# Patient Record
Sex: MALE | Race: BLACK OR AFRICAN AMERICAN | NOT HISPANIC OR LATINO | Employment: UNEMPLOYED | URBAN - METROPOLITAN AREA
[De-identification: names, ages, dates, MRNs, and addresses within clinical notes are randomized per-mention and may not be internally consistent; named-entity substitution may affect disease eponyms.]

---

## 2018-08-14 ENCOUNTER — OFFICE VISIT (OUTPATIENT)
Dept: FAMILY MEDICINE CLINIC | Facility: CLINIC | Age: 38
End: 2018-08-14
Payer: COMMERCIAL

## 2018-08-14 VITALS
HEART RATE: 60 BPM | HEIGHT: 69 IN | TEMPERATURE: 98.4 F | BODY MASS INDEX: 26.39 KG/M2 | SYSTOLIC BLOOD PRESSURE: 124 MMHG | DIASTOLIC BLOOD PRESSURE: 88 MMHG | WEIGHT: 178.2 LBS | RESPIRATION RATE: 16 BRPM

## 2018-08-14 DIAGNOSIS — Z31.41 ENCOUNTER FOR FERTILITY TESTING: ICD-10-CM

## 2018-08-14 DIAGNOSIS — M62.830 SPASM OF BACK MUSCLES: Primary | ICD-10-CM

## 2018-08-14 PROCEDURE — 3008F BODY MASS INDEX DOCD: CPT | Performed by: FAMILY MEDICINE

## 2018-08-14 PROCEDURE — 99203 OFFICE O/P NEW LOW 30 MIN: CPT | Performed by: FAMILY MEDICINE

## 2018-08-14 RX ORDER — TIZANIDINE 2 MG/1
TABLET ORAL
Qty: 30 TABLET | Refills: 0 | Status: SHIPPED | OUTPATIENT
Start: 2018-08-14 | End: 2018-10-10 | Stop reason: HOSPADM

## 2018-08-14 NOTE — PROGRESS NOTES
Chief Complaint   Patient presents with    New Patient     New patient here to establish care  Assessment/Plan:    Spasm of back muscles  Prescription sent to the pharmacy for Tizanidine 2 mg to take 1 tablet twice daily PRN for back spasms  Recommended to call office if symptoms persist or worsen  Encounter for fertility testing  Referred patient to urology for evaluation, discussed fertility testing  HM:  recommended to follow a well-balanced diet,   regular exercise  Advised to stop smoking  Schedule PE next year  Diagnoses and all orders for this visit:    Spasm of back muscles  -     tiZANidine (ZANAFLEX) 2 mg tablet; Take 1 tab  Twice daily PRN for back spasms    Encounter for fertility testing  -     Ambulatory referral to Urology; Future          Subjective:      Patient ID: Sarabjit Sandoval is a 34 y o  male  HPI     New patient presents to establish medical care  He has not been seen by physicians in the area   Patient is originally from [place], came to [place]    2 5 years ago  He lives with his wife  He does not have children, wants to make sure that he does not have any problems with fertility  Patient exercising regularly, goes to the gym, lift weights  C/o occasional back spasms  Denies CP, SOB, dizziness  No family H/o HTN, CAD, DM  Patient drinks alcohol, smokes a few times per week  Denies drug use  The following portions of the patient's history were reviewed and updated as appropriate: allergies, current medications, past medical history, past social history, past surgical history and problem list     Review of Systems   Constitutional: Negative for activity change, appetite change, chills, fatigue and fever  HENT: Negative for congestion, ear pain, nosebleeds, sore throat, tinnitus and trouble swallowing  Eyes: Negative for pain, discharge, redness, itching and visual disturbance     Respiratory: Negative for cough, chest tightness, shortness of breath and wheezing  Cardiovascular: Negative for chest pain, palpitations and leg swelling  Gastrointestinal: Negative for abdominal pain, blood in stool, constipation, diarrhea, nausea and vomiting  Genitourinary: Negative for difficulty urinating, dysuria, flank pain, frequency, hematuria, scrotal swelling and testicular pain  Musculoskeletal: Positive for back pain (occasional pain in back, back spasms)  Negative for arthralgias, gait problem, joint swelling and neck pain  Skin: Negative for rash and wound  Neurological: Negative for dizziness, seizures, syncope and headaches  Hematological: Negative for adenopathy  Does not bruise/bleed easily  Psychiatric/Behavioral: Negative  Objective:      /88 (BP Location: Left arm, Patient Position: Sitting, Cuff Size: Standard)   Pulse 60   Temp 98 4 °F (36 9 °C) (Tympanic)   Resp 16   Ht 5' 8 5" (1 74 m)   Wt 80 8 kg (178 lb 3 2 oz)   BMI 26 70 kg/m²          Physical Exam   Constitutional: He appears well-developed and well-nourished  HENT:   Head: Normocephalic and atraumatic  Right Ear: External ear normal    Left Ear: External ear normal    Mouth/Throat: Oropharynx is clear and moist    Eyes: Conjunctivae are normal  Pupils are equal, round, and reactive to light  Neck: Normal range of motion  Neck supple  Cardiovascular: Normal rate, regular rhythm and normal heart sounds  No murmur heard  No carotid bruits BL  No BL LE edema   Pulmonary/Chest: Effort normal and breath sounds normal  He has no wheezes  He has no rales  Abdominal: Soft  Bowel sounds are normal  There is no tenderness  Musculoskeletal: Normal range of motion  He exhibits no edema, tenderness or deformity  Lymphadenopathy:     He has no cervical adenopathy  Skin: Skin is warm and dry  No rash noted  Psychiatric: He has a normal mood and affect  Nursing note and vitals reviewed

## 2018-08-14 NOTE — ASSESSMENT & PLAN NOTE
Prescription sent to the pharmacy for Tizanidine 2 mg to take 1 tablet twice daily PRN for back spasms  Recommended to call office if symptoms persist or worsen

## 2018-10-10 ENCOUNTER — APPOINTMENT (EMERGENCY)
Dept: RADIOLOGY | Facility: HOSPITAL | Age: 38
End: 2018-10-10
Payer: COMMERCIAL

## 2018-10-10 ENCOUNTER — HOSPITAL ENCOUNTER (EMERGENCY)
Facility: HOSPITAL | Age: 38
Discharge: HOME | End: 2018-10-10
Attending: EMERGENCY MEDICINE
Payer: COMMERCIAL

## 2018-10-10 VITALS
TEMPERATURE: 97.3 F | WEIGHT: 179.9 LBS | OXYGEN SATURATION: 99 % | DIASTOLIC BLOOD PRESSURE: 79 MMHG | SYSTOLIC BLOOD PRESSURE: 143 MMHG | HEIGHT: 68 IN | BODY MASS INDEX: 27.26 KG/M2 | RESPIRATION RATE: 18 BRPM | HEART RATE: 78 BPM

## 2018-10-10 DIAGNOSIS — M54.50 LOW BACK PAIN RADIATING TO RIGHT LOWER EXTREMITY: Primary | ICD-10-CM

## 2018-10-10 DIAGNOSIS — M79.651 RIGHT THIGH PAIN: ICD-10-CM

## 2018-10-10 DIAGNOSIS — M79.604 LOW BACK PAIN RADIATING TO RIGHT LOWER EXTREMITY: Primary | ICD-10-CM

## 2018-10-10 PROCEDURE — 99283 EMERGENCY DEPT VISIT LOW MDM: CPT

## 2018-10-10 PROCEDURE — 96372 THER/PROPH/DIAG INJ SC/IM: CPT

## 2018-10-10 PROCEDURE — 73502 X-RAY EXAM HIP UNI 2-3 VIEWS: CPT

## 2018-10-10 RX ORDER — DICLOFENAC POTASSIUM 50 MG/1
50 TABLET, FILM COATED ORAL 3 TIMES DAILY
Qty: 15 TABLET | Refills: 0 | Status: SHIPPED | OUTPATIENT
Start: 2018-10-10 | End: 2018-10-25 | Stop reason: SDUPTHER

## 2018-10-10 RX ORDER — METHOCARBAMOL 500 MG/1
1000 TABLET, FILM COATED ORAL 3 TIMES DAILY
Qty: 30 TABLET | Refills: 0 | Status: SHIPPED | OUTPATIENT
Start: 2018-10-10 | End: 2020-01-20 | Stop reason: ALTCHOICE

## 2018-10-10 RX ORDER — METHOCARBAMOL 500 MG/1
TABLET, FILM COATED ORAL
Status: COMPLETED
Start: 2018-10-10 | End: 2018-10-10

## 2018-10-10 RX ORDER — KETOROLAC TROMETHAMINE 30 MG/ML
INJECTION, SOLUTION INTRAMUSCULAR; INTRAVENOUS
Status: COMPLETED
Start: 2018-10-10 | End: 2018-10-10

## 2018-10-10 RX ORDER — ACETAMINOPHEN 325 MG/1
TABLET ORAL
Status: COMPLETED
Start: 2018-10-10 | End: 2018-10-10

## 2018-10-10 RX ORDER — KETOROLAC TROMETHAMINE 30 MG/ML
15 INJECTION, SOLUTION INTRAMUSCULAR; INTRAVENOUS ONCE
Status: COMPLETED | OUTPATIENT
Start: 2018-10-10 | End: 2018-10-10

## 2018-10-10 RX ORDER — METHOCARBAMOL 500 MG/1
1000 TABLET, FILM COATED ORAL ONCE
Status: COMPLETED | OUTPATIENT
Start: 2018-10-10 | End: 2018-10-10

## 2018-10-10 RX ORDER — SENNOSIDES 8.6 MG
650 CAPSULE ORAL EVERY 8 HOURS PRN
Qty: 15 TABLET | Refills: 0 | Status: SHIPPED | OUTPATIENT
Start: 2018-10-10 | End: 2018-10-15

## 2018-10-10 RX ORDER — ACETAMINOPHEN 325 MG/1
650 TABLET ORAL EVERY 6 HOURS PRN
Status: DISCONTINUED | OUTPATIENT
Start: 2018-10-10 | End: 2018-10-10 | Stop reason: HOSPADM

## 2018-10-10 RX ADMIN — ACETAMINOPHEN 650 MG: 325 TABLET ORAL at 13:52

## 2018-10-10 RX ADMIN — KETOROLAC TROMETHAMINE 15 MG: 30 INJECTION, SOLUTION INTRAMUSCULAR; INTRAVENOUS at 13:52

## 2018-10-10 RX ADMIN — METHOCARBAMOL TABLETS 1000 MG: 500 TABLET, COATED ORAL at 13:52

## 2018-10-10 RX ADMIN — METHOCARBAMOL 1000 MG: 500 TABLET, FILM COATED ORAL at 13:52

## 2018-10-10 NOTE — DISCHARGE INSTRUCTIONS
Acute Low Back Pain   WHAT YOU NEED TO KNOW:   Acute low back pain is sudden discomfort in your lower back area that lasts for up to 6 weeks  The discomfort makes it difficult to tolerate activity  DISCHARGE INSTRUCTIONS:   Return to the emergency department if:   · You have severe pain  · You have sudden stiffness and heaviness on both buttocks down to both legs  · You have numbness or weakness in one leg, or pain in both legs  · You have numbness in your genital area or across your lower back  · You cannot control your urine or bowel movements  Contact your healthcare provider if:   · You have a fever  · You have pain at night or when you rest     · Your pain does not get better with treatment  · You have pain that worsens when you cough or sneeze  · You suddenly feel something pop or snap in your back  · You have questions or concerns about your condition or care  Medicines: The following medicines may be ordered by your healthcare provider:  · Acetaminophen  decreases pain  It is available without a doctor's order  Ask how much to take and how often to take it  Follow directions  Acetaminophen can cause liver damage if not taken correctly  · NSAIDs  help decrease swelling and pain  This medicine is available with or without a doctor's order  NSAIDs can cause stomach bleeding or kidney problems in certain people  If you take blood thinner medicine, always ask your healthcare provider if NSAIDs are safe for you  Always read the medicine label and follow directions  · Prescription pain medicine  may be given  Ask your healthcare provider how to take this medicine safely  · Muscle relaxers  decrease pain by relaxing the muscles in your lower spine  · Take your medicine as directed  Contact your healthcare provider if you think your medicine is not helping or if you have side effects  Tell him of her if you are allergic to any medicine   Keep a list of the medicines, vitamins, and herbs you take  Include the amounts, and when and why you take them  Bring the list or the pill bottles to follow-up visits  Carry your medicine list with you in case of an emergency  Self-care:   · Stay active  as much as you can without causing more pain  Bed rest could make your back pain worse  Start with some light exercises such as walking  Avoid heavy lifting until your pain is gone  Ask for more information about the activities or exercises that are right for you  · Ice  helps decrease swelling, pain, and muscle spams  Put crushed ice in a plastic bag  Cover it with a towel  Place it on your lower back for 20 to 30 minutes every 2 hours  Do this for about 2 to 3 days after your pain starts, or as directed  · Heat  helps decrease pain and muscle spasms  Start to use heat after treatment with ice has stopped  Use a small towel dampened with warm water or a heating pad, or sit in a warm bath  Apply heat on the area for 20 to 30 minutes every 2 hours for as many days as directed  Alternate heat and ice  Prevent acute low back pain:   · Use proper body mechanics  ¨ Bend at the hips and knees when you  objects  Do not bend from the waist  Use your leg muscles as you lift the load  Do not use your back  Keep the object close to your chest as you lift it  Try not to twist or lift anything above your waist     ¨ Change your position often when you stand for long periods of time  Rest one foot on a small box or footrest, and then switch to the other foot often  ¨ Try not to sit for long periods of time  When you do, sit in a straight-backed chair with your feet flat on the floor  Never reach, pull, or push while you are sitting  · Do exercises that strengthen your back muscles  Warm up before you exercise  Ask your healthcare provider the best exercises for you  · Maintain a healthy weight  Ask your healthcare provider how much you should weigh   Ask him to help you create a weight loss plan if you are overweight  Follow up with your healthcare provider as directed:  Return for a follow-up visit if you still have pain after 1 to 3 weeks of treatment  You may need to visit an orthopedist if your back pain lasts more than 12 weeks  Write down your questions so you remember to ask them during your visits  © 2017 [employer] Information is for End User's use only and may not be sold, redistributed or otherwise used for commercial purposes  All illustrations and images included in CareNotes® are the copyrighted property of A D A M [employer] Inc  or [employer]on [employer]  The above information is an  only  It is not intended as medical advice for individual conditions or treatments  Talk to your doctor, nurse or pharmacist before following any medical regimen to see if it is safe and effective for you  Leg Cramps   WHAT YOU NEED TO KNOW:   A leg cramp is a sudden, painful squeeze in your calf (lower leg) or thigh (upper leg) muscles  The muscle may twitch under the skin or feel hard  Your leg may feel sore long after the muscles relax  DISCHARGE INSTRUCTIONS:   To stretch your leg:  Warm up your muscles before you stretch  Take a short walk or run slowly in place  If you get leg cramps while you sleep, you may also want to stretch before bedtime  The following exercises stretch the calves and thighs to help stop or prevent leg cramps:  · To stretch your calf and heel:      ¨ Stand up and place the palms of your hands against a wall  ¨ Lean into the wall, with one leg behind the other  Bend the front leg and keep the back leg straight  ¨ Press the heel of your back leg into the floor  ¨ Hold for 15 to 30 seconds, then switch sides  · To stretch the back of your knee and thigh:      ¨ Sit on the floor with your legs straight in front of you  Do not point your toes  ¨ Place the palms of your hands at your sides on the floor   Slide your hands past your hips toward your ankles  ¨ Move toward your ankles until you feel a stretch in the back of your legs  Do not try to touch your head to your knees or round your back  ¨ Hold for 30 seconds  Drink plenty of liquids during exercise:  Water and other liquids help prevent cramps by replacing fluids lost in sweat  Drink more liquids when you are active in hot weather  To stop a leg cramp if it happens again:   · Stretch and massage your muscle to stop the cramp  · Apply heat or ice packs to the cramp  A heating pad or hot pack may help relieve tight muscles  An ice pack or crushed ice in a bag, wrapped in a towel can soothe tender or sore muscles  Take your medicine as directed:  Call your healthcare provider if you think your medicine is not helping or if you have side effects  Tell him if you are taking any vitamins, herbs, or other medicines  Keep a list of the medicines you take  Include the amounts, and when and why you take them  Bring the list or the pill bottles to follow-up visits  Follow up with your healthcare provider as directed:  Write down any questions you have so you remember to ask them in your follow-up visits  Contact your healthcare provider if:   · Your leg cramps get worse or happen more often  · Your leg feels numb  · Your leg cramps do not go away with stretching or massage  · You feel dizzy, lightheaded, or confused when you exercise in hot weather  You may have a headache or blurred vision  © 2017 [employer] Information is for End User's use only and may not be sold, redistributed or otherwise used for commercial purposes  All illustrations and images included in CareNotes® are the copyrighted property of [employer]  or [employer]on Clemons  The above information is an  only  It is not intended as medical advice for individual conditions or treatments   Talk to your doctor, nurse or pharmacist before following any medical regimen to see if it is safe and effective for you  Leg Pain   WHAT YOU NEED TO KNOW:   Leg pain may be caused by a variety of health conditions  Your tests did not show any broken bones or blood clots  DISCHARGE INSTRUCTIONS:   Return to the emergency department if:   · You have a fever  · Your leg starts to swell  · Your leg pain gets worse  · You have numbness or tingling in your leg or toes  · You cannot put any weight on or move your leg  Contact your healthcare provider if:   · Your pain does not decrease, even after treatment  · You have questions or concerns about your condition or care  Medicines:   · NSAIDs , such as ibuprofen, help decrease swelling, pain, and fever  This medicine is available with or without a doctor's order  NSAIDs can cause stomach bleeding or kidney problems in certain people  If you take blood thinner medicine, always ask your healthcare provider if NSAIDs are safe for you  Always read the medicine label and follow directions  · Take your medicine as directed  Contact your healthcare provider if you think your medicine is not helping or if you have side effects  Tell him of her if you are allergic to any medicine  Keep a list of the medicines, vitamins, and herbs you take  Include the amounts, and when and why you take them  Bring the list or the pill bottles to follow-up visits  Carry your medicine list with you in case of an emergency  Follow up with your healthcare provider as directed: You may need more tests to find the cause of your leg pain  You may need to see an orthopedic specialist or a physical therapist  Write down your questions so you remember to ask them during your visits  Manage your leg pain:   · Rest  your injured leg so that it can heal  You may need an immobilizer, brace, or splint to limit the movement of your leg  You may need to avoid putting any weight on your leg for at least 48 hours   Return to normal activities as directed  · Ice  the injury for 20 minutes every 4 hours for up to 24 hours, or as directed  Use an ice pack, or put crushed ice in a plastic bag  Cover it with a towel to protect your skin  Ice helps prevent tissue damage and decreases swelling and pain  · Elevate  your injured leg above the level of your heart as often as you can  This will help decrease swelling and pain  If possible, prop your leg on pillows or blankets to keep the area elevated comfortably  · Use assistive devices as directed  You may need to use a cane or crutches  Assistive devices help decrease pain and pressure on your leg when you walk  Ask your healthcare provider for more information about assistive devices and how to use them correctly  · Maintain a healthy weight  Extra body weight can cause pressure and pain in your hip, knee, and ankle joints  Ask your healthcare provider how much you should weigh  Ask him to help you create a weight loss plan if you are overweight  © 2017 [facility] [facility] Information is for End User's use only and may not be sold, redistributed or otherwise used for commercial purposes  All illustrations and images included in CareNotes® are the copyrighted property of [employer]  or Bryon Clemons  The above information is an  only  It is not intended as medical advice for individual conditions or treatments  Talk to your doctor, nurse or pharmacist before following any medical regimen to see if it is safe and effective for you

## 2018-10-10 NOTE — ED PROVIDER NOTES
History  Chief Complaint   Patient presents with    Leg Pain     Patient complaining of pain to right leg, started about 1 week ago with pain in right lower back that radiated into right buttock, now pain pain radiates around into thigh  Patient is a 49-year-old male presenting to the emergency department with right lower back and right anterior and lateral thigh pain while standing  Patient reports he has had ongoing lower back pain for the past couple weeks, was seen by his primary provider and placed on Zanaflex assuming it was muscular in nature  Patient reports that his pain is not resolved and he has now developed right lateral and anterior thigh pain, that comes upon only after he is standing for 2-3 minutes  Patient reports no pain while resting/lying  He reports the pain feels like a stinging/cramping sensation   He denies any weakness in the leg, reports no other numbness or tingling  He denies any incontinence or retention of bowel or bladder  He denies any perineal numbness  He reports no fevers or chills  Reports no history of IV drug abuse, he is not taking steroids regularly and has no other immunocompromising conditions  He is otherwise reporting no other symptoms, denies any other concerns  None       History reviewed  No pertinent past medical history  History reviewed  No pertinent surgical history  Family History   Problem Relation Age of Onset    No Known Problems Mother     No Known Problems Father      I have reviewed and agree with the history as documented  Social History   Substance Use Topics    Smoking status: Current Some Day Smoker    Smokeless tobacco: Never Used      Comment: Cigar once a week    Alcohol use Yes      Comment: Occasional        Review of Systems   Constitutional: Negative for chills, fatigue and fever  Respiratory: Negative for chest tightness and shortness of breath  Cardiovascular: Negative for chest pain  Gastrointestinal: Negative for abdominal pain, constipation, diarrhea, nausea and vomiting  Genitourinary: Negative for decreased urine volume  Musculoskeletal: Positive for back pain (Right lower) and myalgias ( right lateral and anterior thigh)  Negative for joint swelling, neck pain and neck stiffness  Skin: Negative for rash and wound  Allergic/Immunologic: Negative for immunocompromised state  Neurological: Negative for weakness and numbness  Hematological: Negative for adenopathy  Physical Exam  Physical Exam   Constitutional: He is oriented to person, place, and time  Vital signs are normal  He appears well-developed and well-nourished  Non-toxic appearance  He does not have a sickly appearance  He does not appear ill  No distress  Eyes: Conjunctivae are normal    Neck: Neck supple  Cardiovascular: Normal rate and regular rhythm  Pulmonary/Chest: Effort normal and breath sounds normal  No respiratory distress  Abdominal: There is no tenderness  Musculoskeletal:        Right hip: Normal         Right knee: Normal         Right ankle: Normal         Cervical back: Normal         Thoracic back: Normal         Lumbar back: He exhibits tenderness, pain and spasm  He exhibits normal range of motion, no bony tenderness, no swelling, no edema, no deformity, no laceration and normal pulse  Back:         Left upper leg: He exhibits tenderness  He exhibits no bony tenderness, no swelling, no edema, no deformity and no laceration  Legs:  Neurological: He is alert and oriented to person, place, and time  He has normal strength  No sensory deficit  Reflex Scores:       Patellar reflexes are 2+ on the right side and 2+ on the left side  Achilles reflexes are 1+ on the right side and 1+ on the left side  Patient has 5/5 strength in both lower extremities  He is able to go up on toes and back on heels without difficulty    Able to plantar flex and dorsiflex against resistance  Able to dorsiflex and plantar flex the great toe against resistance  Skin: Skin is warm and dry  Psychiatric: He has a normal mood and affect  Nursing note and vitals reviewed  Vital Signs  ED Triage Vitals   Temperature Pulse Respirations Blood Pressure SpO2   10/10/18 1315 10/10/18 1315 10/10/18 1315 10/10/18 1315 10/10/18 1315   (!) 97 3 °F (36 3 °C) 78 18 143/79 99 %      Temp Source Heart Rate Source Patient Position - Orthostatic VS BP Location FiO2 (%)   10/10/18 1315 10/10/18 1315 10/10/18 1315 10/10/18 1315 --   Tympanic Monitor Sitting Left arm       Pain Score       10/10/18 1352       Worst Possible Pain           Vitals:    10/10/18 1315   BP: 143/79   Pulse: 78   Patient Position - Orthostatic VS: Sitting       Visual Acuity      ED Medications  Medications   acetaminophen (TYLENOL) tablet 650 mg (650 mg Oral Given 10/10/18 1352)   ketorolac (TORADOL) injection 15 mg (15 mg Intramuscular Given 10/10/18 1352)   methocarbamol (ROBAXIN) tablet 1,000 mg (1,000 mg Oral Given 10/10/18 1352)       Diagnostic Studies  Results Reviewed     None                 XR hip/pelv 2-3 vws right if performed   Final Result by Rachel Multani DO (10/10 1417)      No acute osseous abnormality  Workstation performed: [id number]                    Procedures  Procedures       Phone Contacts  ED Phone Contact    ED Course                               MDM  Number of Diagnoses or Management Options  Low back pain radiating to right lower extremity: new and does not require workup  Right thigh pain: new and requires workup  Diagnosis management comments: Right hip x-ray is negative for any acute osseous abnormality, confirmed by radiologist    Unknown etiology of lower back and thigh pain  Will prescribe diclofenac and Tylenol for pain and Robaxin for skeletal muscle relaxation  Patient is reporting referred to Orthopedics for follow-up    He is also instructed to follow up with primary provider for recheck  Advised to return to the ED with any worsening symptoms or emergent concerns  Amount and/or Complexity of Data Reviewed  Tests in the radiology section of CPT®: ordered and reviewed    Patient Progress  Patient progress: stable    CritCare Time    Disposition  Final diagnoses:   Low back pain radiating to right lower extremity   Right thigh pain     Time reflects when diagnosis was documented in both MDM as applicable and the Disposition within this note     Time User Action Codes Description Comment    10/10/2018  2:37 PM Heriberto Setting Add [M54 5] Low back pain radiating to right lower extremity     10/10/2018  2:38 PM Heriberto Setting Add [B36 407] Right thigh pain       ED Disposition     ED Disposition Condition Comment    Discharge  Jamesbellaaudie LEAH Richardson discharge to home/self care      Condition at discharge: Stable        Follow-up Information     Follow up With Specialties Details Why Contact Info Additional Information    Agnieszka Owens MD Family Medicine Schedule an appointment as soon as possible for a visit  [facility] [address] [facility]  [phone]       [address] Orthopedic Surgery Schedule an appointment as soon as possible for a visit  [facility] [id number]  [address] [place] [id number]    [facility] Emergency Department Emergency Medicine  As needed, If symptoms worsen [address] [facility] [id number] [phone]           Patient's Medications   Discharge Prescriptions    ACETAMINOPHEN (TYLENOL) 650 MG CR TABLET    Take 1 tablet (650 mg total) by mouth every 8 (eight) hours as needed for mild pain or moderate pain for up to 5 days       Start Date: 10/10/2018End Date: 10/15/2018       Order Dose: 650 mg       Quantity: 15 tablet    Refills: 0    DICLOFENAC POTASSIUM (CATAFLAM) 50 MG TABLET    Take 1 tablet (50 mg total) by mouth 3 (three) times a day for 5 days       Start Date: 10/10/2018End Date: 10/15/2018       Order Dose: 50 mg       Quantity: 15 tablet    Refills: 0    METHOCARBAMOL (ROBAXIN) 500 MG TABLET    Take 2 tablets (1,000 mg total) by mouth 3 (three) times a day for 5 days       Start Date: 10/10/2018End Date: 10/15/2018       Order Dose: 1,000 mg       Quantity: 30 tablet    Refills: 0     No discharge procedures on file      ED Provider  Electronically Signed by           GRACIELA Estrada  10/10/18 7756

## 2018-10-11 ENCOUNTER — OFFICE VISIT (OUTPATIENT)
Dept: OBGYN CLINIC | Facility: MEDICAL CENTER | Age: 38
End: 2018-10-11
Payer: COMMERCIAL

## 2018-10-11 ENCOUNTER — APPOINTMENT (OUTPATIENT)
Dept: RADIOLOGY | Facility: CLINIC | Age: 38
End: 2018-10-11
Payer: COMMERCIAL

## 2018-10-11 VITALS
SYSTOLIC BLOOD PRESSURE: 143 MMHG | HEART RATE: 52 BPM | BODY MASS INDEX: 27.73 KG/M2 | WEIGHT: 182.4 LBS | DIASTOLIC BLOOD PRESSURE: 95 MMHG

## 2018-10-11 DIAGNOSIS — M54.5 LOW BACK PAIN, UNSPECIFIED BACK PAIN LATERALITY, UNSPECIFIED CHRONICITY, WITH SCIATICA PRESENCE UNSPECIFIED: ICD-10-CM

## 2018-10-11 DIAGNOSIS — M54.41 ACUTE RIGHT-SIDED LOW BACK PAIN WITH RIGHT-SIDED SCIATICA: Primary | ICD-10-CM

## 2018-10-11 PROCEDURE — 72100 X-RAY EXAM L-S SPINE 2/3 VWS: CPT

## 2018-10-11 PROCEDURE — 99203 OFFICE O/P NEW LOW 30 MIN: CPT | Performed by: EMERGENCY MEDICINE

## 2018-10-11 NOTE — LETTER
October 11, 2018     Patient: Dena Favorite   YOB: 1980   Date of Visit: 10/11/2018       To Whom it May Concern:    Junie Colon is under my professional care  He was seen in my office on 10/11/2018  He may return to work with recommended restrictions:  No prolonged standing/walking, max lifting 20 lbs    If you have any questions or concerns, please don't hesitate to call           Sincerely,          Kellie Griffin MD        CC: No Recipients

## 2018-10-11 NOTE — PROGRESS NOTES
Assessment/Plan:    Diagnoses and all orders for this visit:    Acute right-sided low back pain with right-sided sciatica  -     XR spine lumbar 2 or 3 views injury; Future  -     Ambulatory referral to Physical Therapy; Future    Continue diclofenac and tylenol, start PT  Recommended restrictions given to patient for work for which we will see if they accept  Return in about 2 weeks (around 10/25/2018)  Chief Complaint:   back and leg pain    Subjective:   Patient ID: Сергей Robbins is a 34 y o  male  NP presents for right lower back pain with radiation into thigh  Denies any red flags  Pain is worse with prolonged standing usually after 30 minutes, he works in [address] and does prologned standing but denies heavy lifting  Review of Systems   Constitutional: Negative for fever  Respiratory: Negative for shortness of breath  Cardiovascular: Negative for chest pain  Gastrointestinal: Negative for abdominal pain  Musculoskeletal: Positive for back pain  Neurological: Negative for weakness  The following portions of the patient's chart were reviewed and updated as appropriate: Allergy:  No Known Allergies    History reviewed  No pertinent past medical history  History reviewed  No pertinent surgical history  Social History     Social History    Marital status: /Civil Union     Spouse name: N/A    Number of children: N/A    Years of education: N/A     Occupational History    Not on file       Social History Main Topics    Smoking status: Current Some Day Smoker    Smokeless tobacco: Never Used      Comment: Cigar once a week    Alcohol use Yes      Comment: Occasional    Drug use: No    Sexual activity: Not on file     Other Topics Concern    Not on file     Social History Narrative    No narrative on file       Family History   Problem Relation Age of Onset    No Known Problems Mother     No Known Problems Father        Medications:    Current Outpatient Prescriptions:     acetaminophen (TYLENOL) 650 mg CR tablet, Take 1 tablet (650 mg total) by mouth every 8 (eight) hours as needed for mild pain or moderate pain for up to 5 days, Disp: 15 tablet, Rfl: 0    diclofenac potassium (CATAFLAM) 50 mg tablet, Take 1 tablet (50 mg total) by mouth 3 (three) times a day for 5 days, Disp: 15 tablet, Rfl: 0    methocarbamol (ROBAXIN) 500 mg tablet, Take 2 tablets (1,000 mg total) by mouth 3 (three) times a day for 5 days, Disp: 30 tablet, Rfl: 0  No current facility-administered medications for this visit  Patient Active Problem List   Diagnosis    Spasm of back muscles    Encounter for fertility testing       Objective:  Back Exam     Range of Motion   The patient has normal back ROM  Muscle Strength   The patient has normal back strength  Tests   Straight leg raise right: negative    Reflexes   Patellar: normal    Other   Toe Walk: normal  Heel Walk: normal  Sensation: normal  Gait: normal     Comments:  Back pain with forward flexion            Physical Exam      Neurologic Exam    Procedures    I have personally reviewed the written report of the pertinent studies     Xray Hip/Pelvis obtained in ER:  [facility]

## 2018-10-11 NOTE — PATIENT INSTRUCTIONS
Continue diclofenac and Tylenol, which are safe to take together    You can use ice or heat for your back  We will start Physical therapy

## 2018-10-15 ENCOUNTER — TELEPHONE (OUTPATIENT)
Dept: OBGYN CLINIC | Facility: HOSPITAL | Age: 38
End: 2018-10-15

## 2018-10-15 NOTE — TELEPHONE ENCOUNTER
Spoke with PT wife, she stated that the HR needed the standing restrictions to be more specific  So if you are able to add to the note that you previously wrote and specify how long the PT can stand for and how often does he need a break in between standing  I asked the wife if she could get a note, but said HR is too complicated to get to and would not hear from them in time  She also stated she needed the note by Wednesday because the PT needed to go back to work as soon as he can due to benefits

## 2018-10-15 NOTE — TELEPHONE ENCOUNTER
Patients wife is calling stating that the employer for the patient said the restrictions are too vague and needs more description as to what specifically the patient can and cannot do at his job      Domenic sal

## 2018-10-15 NOTE — TELEPHONE ENCOUNTER
Can you please call patient or wife and ask them if his employer can provide forms for us to fill out regarding restrictions, as they need us to be more specific    Thanks

## 2018-10-25 ENCOUNTER — OFFICE VISIT (OUTPATIENT)
Dept: OBGYN CLINIC | Facility: MEDICAL CENTER | Age: 38
End: 2018-10-25
Payer: COMMERCIAL

## 2018-10-25 VITALS
HEART RATE: 59 BPM | DIASTOLIC BLOOD PRESSURE: 84 MMHG | SYSTOLIC BLOOD PRESSURE: 125 MMHG | BODY MASS INDEX: 27.58 KG/M2 | HEIGHT: 68 IN | WEIGHT: 182 LBS

## 2018-10-25 DIAGNOSIS — M79.651 RIGHT THIGH PAIN: ICD-10-CM

## 2018-10-25 DIAGNOSIS — M79.604 LOW BACK PAIN RADIATING TO RIGHT LOWER EXTREMITY: ICD-10-CM

## 2018-10-25 DIAGNOSIS — M54.50 LOW BACK PAIN RADIATING TO RIGHT LOWER EXTREMITY: ICD-10-CM

## 2018-10-25 PROCEDURE — 99213 OFFICE O/P EST LOW 20 MIN: CPT | Performed by: EMERGENCY MEDICINE

## 2018-10-25 RX ORDER — DICLOFENAC POTASSIUM 50 MG/1
50 TABLET, FILM COATED ORAL 3 TIMES DAILY
Qty: 20 TABLET | Refills: 0 | Status: SHIPPED | OUTPATIENT
Start: 2018-10-25 | End: 2020-01-20

## 2018-10-25 RX ORDER — DICLOFENAC POTASSIUM 50 MG/1
50 TABLET, FILM COATED ORAL 3 TIMES DAILY
Qty: 20 TABLET | Refills: 0 | Status: SHIPPED | OUTPATIENT
Start: 2018-10-25 | End: 2018-10-25 | Stop reason: SDUPTHER

## 2018-10-25 NOTE — TELEPHONE ENCOUNTER
Galdino Avila from [place] is resending an ADA form to be completed and returned for [facility]    Thank you

## 2018-10-25 NOTE — PATIENT INSTRUCTIONS
You may use diclofenac for pain and inflammation 1-2 times per day  However if you taking diclofenac to do not want to take Advil as well  You may take Tylenol with either diclofenac or Advil    Ice  Continue spa

## 2018-10-25 NOTE — PROGRESS NOTES
Assessment/Plan:    Diagnoses and all orders for this visit:    Low back pain radiating to right lower extremity  -     Discontinue: diclofenac potassium (CATAFLAM) 50 mg tablet; Take 1 tablet (50 mg total) by mouth 3 (three) times a day for 5 days  -     diclofenac potassium (CATAFLAM) 50 mg tablet; Take 1 tablet (50 mg total) by mouth 3 (three) times a day for 5 days    Right thigh pain  -     Discontinue: diclofenac potassium (CATAFLAM) 50 mg tablet; Take 1 tablet (50 mg total) by mouth 3 (three) times a day for 5 days  -     diclofenac potassium (CATAFLAM) 50 mg tablet; Take 1 tablet (50 mg total) by mouth 3 (three) times a day for 5 days    Patient improved  Continue with restrictions for work  Return in about 4 weeks (around 11/22/2018)  Chief Complaint:   leg pain    Subjective:   Patient ID: Jaskaran Page is a 34 y o  male  Patient returns with no back pain but he has right lateral hip pain and anteiror knee pain, but pain with come and go and migrate with even pain left knee  Pain is anterior thigh is improved  Since last eval he stopped going to gym and plans to return December  He did not start PT due to cost and time but he has been going to a spa for treatment x 2  Needs refill of meds  Previous note:  NP presents for right lower back pain with radiation into thigh  Denies any red flags  Pain is worse with prolonged standing usually after 30 minutes, he works in [email or web address] and does prologned standing but denies heavy lifting  Review of Systems    The following portions of the patient's chart were reviewed and updated as appropriate: Allergy:  No Known Allergies    History reviewed  No pertinent past medical history  History reviewed  No pertinent surgical history  Social History     Social History    Marital status: /Civil Union     Spouse name: N/A    Number of children: N/A    Years of education: N/A     Occupational History    Not on file  Social History Main Topics    Smoking status: Current Some Day Smoker    Smokeless tobacco: Never Used      Comment: Cigar once a week    Alcohol use Yes      Comment: Occasional    Drug use: No    Sexual activity: Not on file     Other Topics Concern    Not on file     Social History Narrative    No narrative on file       Family History   Problem Relation Age of Onset    No Known Problems Mother     No Known Problems Father        Medications:    Current Outpatient Prescriptions:     diclofenac potassium (CATAFLAM) 50 mg tablet, Take 1 tablet (50 mg total) by mouth 3 (three) times a day for 5 days, Disp: 20 tablet, Rfl: 0    methocarbamol (ROBAXIN) 500 mg tablet, Take 2 tablets (1,000 mg total) by mouth 3 (three) times a day for 5 days, Disp: 30 tablet, Rfl: 0    Patient Active Problem List   Diagnosis    Spasm of back muscles    Encounter for fertility testing       Objective:  Right Hip Exam     Tenderness   The patient is experiencing tenderness in the greater trochanter  Range of Motion   The patient has normal right hip ROM  Physical Exam   Constitutional: He is oriented to person, place, and time  He appears well-developed and well-nourished  HENT:   Head: Normocephalic and atraumatic  Eyes: Conjunctivae are normal    Neck: Neck supple  Pulmonary/Chest: Effort normal    Neurological: He is alert and oriented to person, place, and time  Skin: Skin is warm and dry  Psychiatric: He has a normal mood and affect  His behavior is normal    Vitals reviewed  Neurologic Exam     Mental Status   Oriented to person, place, and time  Procedures    There are no pertinent studies obtained with regards to today's office visit

## 2018-10-26 ENCOUNTER — TELEPHONE (OUTPATIENT)
Dept: OBGYN CLINIC | Facility: MEDICAL CENTER | Age: 38
End: 2018-10-26

## 2018-10-26 ENCOUNTER — TELEPHONE (OUTPATIENT)
Dept: OBGYN CLINIC | Facility: CLINIC | Age: 38
End: 2018-10-26

## 2018-10-26 NOTE — TELEPHONE ENCOUNTER
V/m was left from Sabrina Meigs , an HR rep from patient's employer called regarding some paperwork that was sent to him after patient's visit with Dr Lexi Pedro yesterday  He has some questions about this paperwork  Requesting callback @ [phone]  If it is questions about work restrictions, I recommend calling the rep & ask what questions he has and then forward this to Dr Lexi Pedro if its medical questions

## 2018-10-29 ENCOUNTER — TELEPHONE (OUTPATIENT)
Dept: OBGYN CLINIC | Facility: MEDICAL CENTER | Age: 38
End: 2018-10-29

## 2018-10-29 NOTE — TELEPHONE ENCOUNTER
So there is an " injured worker's form" which was filled out by someone under [employer] which does state that he can lift up to 40 pounds  However this is incorrect, it was filled out incorrectly  Max lifting for him is 20 pounds for now  Maybe I can fill out that form again but the next several days are busy  Can you please call his job and let them know max lifting/pushing/pulling is 20 lbs  Thanks!

## 2018-10-29 NOTE — TELEPHONE ENCOUNTER
Patients employer called with questions regarding his restrictions for work  In one of his work notes it states that he cannot lift anything over 20lb and nothing about push, pull, or bending  On another note it states he is allowed to push, pull, or bend 21lb-41lb and allowed to lift 21lb-41lb  They were wondering which one was the more accurate one so that they may accomodate him correctly  Please specify

## 2018-11-03 ENCOUNTER — TELEPHONE (OUTPATIENT)
Dept: OBGYN CLINIC | Facility: HOSPITAL | Age: 38
End: 2018-11-03

## 2018-11-03 NOTE — TELEPHONE ENCOUNTER
Caller: Borisjose ddanny Gudelia (wife)  Patient: Anna Puenteill  C/B #: [phone]    Robert Galeas called after hours to request a revised letter for patient changing his restrictions to be able to lift 25-30 pounds with frequent breaks  He is willing to come to the office to  the revised letter  Please call on Monday to advise if this can be changed

## 2018-12-10 ENCOUNTER — TELEPHONE (OUTPATIENT)
Dept: OBGYN CLINIC | Facility: HOSPITAL | Age: 38
End: 2018-12-10

## 2018-12-10 NOTE — TELEPHONE ENCOUNTER
Hey, Patient was suppose to f/u with me November  Could you have someone call and schedule an appt with me so that I can make further determinations regarding his work restrictions  Thanks!

## 2018-12-10 NOTE — TELEPHONE ENCOUNTER
Brinda Harris, @ [employer]  Fax [phone]    Patients job states his ADA paperwork restrictions are about to run out  Patients next appointment is 1/31/19  They are asking if you are releasing him to full duty or extending restrictions? If an extension is required they just need a new note faxed to number above stating restrictions and an end date

## 2018-12-13 NOTE — TELEPHONE ENCOUNTER
Spoke with patients wife on Tuesday 12/11/18 about making an appointment for  regarding work restrictions, she said that she was unsure if she is able to make it in because their insurance is in the process of changing and their co-pay is too much  She also said that his job is the one requesting the work restrictions and she will speak with her  to see if they are able to make it in, her  is only working Thursdays and Fridays and those are the only days Dr No Brantley is in the [facility] office  I called trying to follow up with making an appointment on Wednesday 12/ 12/18 and Thursday 12/13/18 and there was no answer  I left voicemail both days asking for a call back

## 2018-12-24 NOTE — TELEPHONE ENCOUNTER
Jay Herrera  [phone]  Dr [address]    Pt's wife called stating that she would like a call back because pt's job is stating he needed a new work note  Suleiman Trammell also stated he has an appt for December 28th  I told her I see no appt for that date in his chart and that he has appt on 1/31 and she said yeah I know but they needed to see him sooner than that so we are scheduled to come in on December 28th  Please call to advise

## 2019-01-04 ENCOUNTER — OFFICE VISIT (OUTPATIENT)
Dept: OBGYN CLINIC | Facility: MEDICAL CENTER | Age: 39
End: 2019-01-04
Payer: COMMERCIAL

## 2019-01-04 VITALS
DIASTOLIC BLOOD PRESSURE: 78 MMHG | HEART RATE: 84 BPM | BODY MASS INDEX: 27.31 KG/M2 | SYSTOLIC BLOOD PRESSURE: 118 MMHG | WEIGHT: 174 LBS | HEIGHT: 67 IN

## 2019-01-04 DIAGNOSIS — M79.651 RIGHT THIGH PAIN: ICD-10-CM

## 2019-01-04 DIAGNOSIS — M54.41 ACUTE RIGHT-SIDED LOW BACK PAIN WITH RIGHT-SIDED SCIATICA: Primary | ICD-10-CM

## 2019-01-04 PROCEDURE — 99213 OFFICE O/P EST LOW 20 MIN: CPT | Performed by: EMERGENCY MEDICINE

## 2019-01-04 NOTE — PATIENT INSTRUCTIONS
Search on YouTube for core strengthening exercises as well as stretching and flexibility  You can also perform yogo or piliates for core strengthening

## 2019-01-04 NOTE — PROGRESS NOTES
Assessment/Plan:    Diagnoses and all orders for this visit:    Acute right-sided low back pain with right-sided sciatica    Right thigh pain        Return if symptoms worsen or fail to improve  Chief Complaint:   f/u back pain    Subjective:   Patient ID: Garrett Davila is a 34 y o  male  Patient returns hoping for a release note for work as he is mostly pain free  Initial note: Patient returns with no back pain but he has right lateral hip pain and anteiror knee pain, but pain with come and go and migrate with even pain left knee  Pain is anterior thigh is improved  Since last eval he stopped going to gym and plans to return December  He did not start PT due to cost and time but he has been going to a spa for treatment x 2  Needs refill of meds  Previous note:  NP presents for right lower back pain with radiation into thigh  Denies any red flags  Pain is worse with prolonged standing usually after 30 minutes, he works in [address] [employer] and does prologned standing but denies heavy lifting  Review of Systems   Constitutional: Negative  HENT: Negative  Eyes: Negative  Respiratory: Negative  Cardiovascular: Negative  Gastrointestinal: Negative  Endocrine: Negative  Genitourinary: Negative  Skin: Negative  Neurological: Negative  Psychiatric/Behavioral: Negative  The following portions of the patient's chart were reviewed and updated as appropriate: Allergy:  No Known Allergies    History reviewed  No pertinent past medical history  History reviewed  No pertinent surgical history  Social History     Social History    Marital status: /Civil Union     Spouse name: N/A    Number of children: N/A    Years of education: N/A     Occupational History    Not on file       Social History Main Topics    Smoking status: Current Some Day Smoker    Smokeless tobacco: Never Used      Comment: Cigar once a week    Alcohol use Yes Comment: Occasional    Drug use: No    Sexual activity: Not on file     Other Topics Concern    Not on file     Social History Narrative    No narrative on file       Family History   Problem Relation Age of Onset    No Known Problems Mother     No Known Problems Father        Medications:    Current Outpatient Prescriptions:     diclofenac potassium (CATAFLAM) 50 mg tablet, Take 1 tablet (50 mg total) by mouth 3 (three) times a day for 5 days, Disp: 20 tablet, Rfl: 0    methocarbamol (ROBAXIN) 500 mg tablet, Take 2 tablets (1,000 mg total) by mouth 3 (three) times a day for 5 days, Disp: 30 tablet, Rfl: 0    Patient Active Problem List   Diagnosis    Spasm of back muscles    Encounter for fertility testing       Objective:  Back Exam     Range of Motion   The patient has normal back ROM  Other   Gait: normal             Physical Exam   Constitutional: He is oriented to person, place, and time  He appears well-developed and well-nourished  HENT:   Head: Normocephalic and atraumatic  Eyes: Conjunctivae are normal    Neck: Neck supple  Pulmonary/Chest: Effort normal    Neurological: He is alert and oriented to person, place, and time  Skin: Skin is warm and dry  Psychiatric: He has a normal mood and affect  His behavior is normal          Neurologic Exam     Mental Status   Oriented to person, place, and time  Procedures    There are no pertinent studies obtained with regards to today's office visit

## 2019-01-04 NOTE — LETTER
January 4, 2019     Patient: Oma Ortega   YOB: 1980   Date of Visit: 1/4/2019       To Whom it May Concern:    Megan Brar is under my professional care  He was seen in my office on 1/4/2019  He is much improved and back to his baseline and may return to work with no restrictions  Hernando Zhang may follow up with me as needed  If you have any questions or concerns, please don't hesitate to call           Sincerely,          Phill Wallace MD        CC: No Recipients

## 2020-01-19 PROBLEM — Z00.00 WELL ADULT EXAM: Status: ACTIVE | Noted: 2018-08-14

## 2020-01-20 ENCOUNTER — OFFICE VISIT (OUTPATIENT)
Dept: FAMILY MEDICINE CLINIC | Facility: CLINIC | Age: 40
End: 2020-01-20
Payer: COMMERCIAL

## 2020-01-20 VITALS
HEIGHT: 68 IN | BODY MASS INDEX: 26.46 KG/M2 | OXYGEN SATURATION: 98 % | HEART RATE: 60 BPM | DIASTOLIC BLOOD PRESSURE: 78 MMHG | TEMPERATURE: 97.1 F | WEIGHT: 174.6 LBS | SYSTOLIC BLOOD PRESSURE: 124 MMHG | RESPIRATION RATE: 16 BRPM

## 2020-01-20 DIAGNOSIS — Z00.00 WELL ADULT EXAM: Primary | ICD-10-CM

## 2020-01-20 DIAGNOSIS — Z23 NEED FOR TDAP VACCINATION: ICD-10-CM

## 2020-01-20 DIAGNOSIS — Z13.6 SCREENING FOR CARDIOVASCULAR CONDITION: ICD-10-CM

## 2020-01-20 DIAGNOSIS — N46.9 MALE FERTILITY PROBLEM: ICD-10-CM

## 2020-01-20 PROCEDURE — 90715 TDAP VACCINE 7 YRS/> IM: CPT

## 2020-01-20 PROCEDURE — 99396 PREV VISIT EST AGE 40-64: CPT | Performed by: FAMILY MEDICINE

## 2020-01-20 PROCEDURE — 90471 IMMUNIZATION ADMIN: CPT

## 2020-01-20 NOTE — ASSESSMENT & PLAN NOTE
Recommended to follow a well-balanced diet, regular exercise  Tdap administered today  Patient declined Flu vaccination  Check labs

## 2020-01-20 NOTE — PROGRESS NOTES
Chief Complaint   Patient presents with   [facility] Physical Exam     Annual Physical     Health Maintenance   Topic Date Due    HIV Screening  04/24/1993    BMI: Followup Plan  04/24/1996    Annual Physical  04/24/1996    Influenza Vaccine  01/20/2021 (Originally 7/1/2019)    Depression Screening PHQ  01/20/2021    BMI: Adult  01/20/2021    DTaP,Tdap,and Td Vaccines (2 - Td) 01/20/2030    Pneumococcal Vaccine: 65+ Years (1 of 2 - PCV13) 04/24/2043    Pneumococcal Vaccine: Pediatrics (0 to 5 Years) and At-Risk Patients (6 to 59 Years)  Aged Out    HIB Vaccine  Aged Out    Hepatitis B Vaccine  Aged Out    IPV Vaccine  Aged Out    Hepatitis A Vaccine  Aged Out    Meningococcal ACWY Vaccine  Aged Out    HPV Vaccine  Aged Out     BMI Counseling: Body mass index is 26 74 kg/m²  The BMI is above normal  Nutrition recommendations include reducing portion sizes, decreasing overall calorie intake, 3-5 servings of fruits/vegetables daily, reducing fast food intake, consuming healthier snacks, decreasing soda and/or juice intake, moderation in carbohydrate intake, increasing intake of lean protein, reducing intake of saturated fat and trans fat and reducing intake of cholesterol  Exercise recommendations include moderate aerobic physical activity for 150 minutes/week  Assessment/Plan:    Well adult exam  Recommended to follow a well-balanced diet, regular exercise  Tdap administered today  Patient declined Flu vaccination  Check labs  Male fertility problem  Will refer to [facility] for evaluation  Schedule physical exam in 1 year  Call office with any acute problems  Diagnoses and all orders for this visit:    Well adult exam  -     CBC and differential; Future  -     Comprehensive metabolic panel; Future  -     TSH, 3rd generation with Free T4 reflex; Future  -     Lipid panel; Future    Male fertility problem  -     Ambulatory referral to Urology;  Future    Screening for cardiovascular condition  -     Lipid panel; Future    Need for Tdap vaccination  -     TDAP VACCINE GREATER THAN OR EQUAL TO 8YO IM          Subjective:      Patient ID: Ravi Peguero is a 37 y o  male  HPI     Patient presents for well adult physical exam       He denies history of allergies, asthma  Former smoker  Denies chest pain, shortness of breath, dizziness  No cough  Patient would like to see a urologist to discuss fertility problems  He does not have children  He has been sexually active with his wife for the last 3 years  His wife was evaluated by gynecologist and does not have any gynecological problems but cannot get pregnant  Patient exercises regularly, goes to the gym, lift weights  Smokes marijuana, drinks alcohol socially  Declined Flu vaccination  The following portions of the patient's history were reviewed and updated as appropriate: allergies, past family history, past medical history, past social history, past surgical history and problem list     Review of Systems   Constitutional: Negative for activity change, appetite change, chills, fatigue and fever  HENT: Negative for congestion, dental problem, ear pain, mouth sores, nosebleeds, sore throat, tinnitus and trouble swallowing  Eyes: Negative for pain, discharge, redness, itching and visual disturbance  Respiratory: Negative for cough, chest tightness, shortness of breath and wheezing  Cardiovascular: Negative for chest pain, palpitations and leg swelling  Gastrointestinal: Negative for abdominal pain, blood in stool, constipation, diarrhea, nausea and vomiting  Genitourinary: Negative for difficulty urinating, dysuria, flank pain, frequency, hematuria and testicular pain  Fertility problem   Musculoskeletal: Negative for arthralgias, back pain, joint swelling, myalgias and neck pain  Skin: Negative for rash and wound     Neurological: Negative for dizziness, seizures, syncope, numbness and headaches  Hematological: Negative  Psychiatric/Behavioral: Negative  Objective:      /78 (BP Location: Right arm, Patient Position: Sitting, Cuff Size: Adult)   Pulse 60   Temp (!) 97 1 °F (36 2 °C) (Tympanic)   Resp 16   Ht 5' 7 75" (1 721 m)   Wt 79 2 kg (174 lb 9 6 oz)   SpO2 98%   BMI 26 74 kg/m²          Physical Exam   Constitutional: He appears well-developed and well-nourished  HENT:   Head: Normocephalic and atraumatic  Right Ear: External ear normal    Left Ear: External ear normal    Mouth/Throat: Oropharynx is clear and moist    Eyes: Pupils are equal, round, and reactive to light  Conjunctivae are normal    Cardiovascular: Normal rate, regular rhythm and normal heart sounds  No murmur heard  No carotid bruits BL  No BL LE edema   Pulmonary/Chest: Effort normal and breath sounds normal    Abdominal: Soft  Bowel sounds are normal  There is no tenderness  Musculoskeletal: Normal range of motion  He exhibits no edema, tenderness or deformity  Neurological: No cranial nerve deficit  Coordination normal    Skin: Skin is warm and dry  No rash noted  Psychiatric: He has a normal mood and affect  Nursing note and vitals reviewed

## 2020-01-24 ENCOUNTER — LAB (OUTPATIENT)
Dept: LAB | Facility: HOSPITAL | Age: 40
End: 2020-01-24
Payer: COMMERCIAL

## 2020-01-24 DIAGNOSIS — Z13.6 SCREENING FOR CARDIOVASCULAR CONDITION: ICD-10-CM

## 2020-01-24 DIAGNOSIS — Z00.00 WELL ADULT EXAM: ICD-10-CM

## 2020-01-24 LAB
ALBUMIN SERPL BCP-MCNC: 4.4 G/DL (ref 3–5.2)
ALP SERPL-CCNC: 53 U/L (ref 43–122)
ALT SERPL W P-5'-P-CCNC: 32 U/L (ref 9–52)
ANION GAP SERPL CALCULATED.3IONS-SCNC: 10 MMOL/L (ref 5–14)
ANISOCYTOSIS BLD QL SMEAR: PRESENT
AST SERPL W P-5'-P-CCNC: 39 U/L (ref 17–59)
BASOPHILS # BLD AUTO: 0 THOUSANDS/ΜL (ref 0–0.1)
BASOPHILS NFR BLD AUTO: 0 % (ref 0–1)
BILIRUB SERPL-MCNC: 1 MG/DL
BUN SERPL-MCNC: 15 MG/DL (ref 5–25)
CALCIUM SERPL-MCNC: 9.6 MG/DL (ref 8.4–10.2)
CHLORIDE SERPL-SCNC: 104 MMOL/L (ref 97–108)
CHOLEST SERPL-MCNC: 193 MG/DL
CO2 SERPL-SCNC: 25 MMOL/L (ref 22–30)
CREAT SERPL-MCNC: 0.96 MG/DL (ref 0.7–1.5)
DACRYOCYTES BLD QL SMEAR: PRESENT
EOSINOPHIL # BLD AUTO: 0.2 THOUSAND/ΜL (ref 0–0.4)
EOSINOPHIL NFR BLD AUTO: 5 % (ref 0–6)
ERYTHROCYTE [DISTWIDTH] IN BLOOD BY AUTOMATED COUNT: 17.7 %
GFR SERPL CREATININE-BSD FRML MDRD: 113 ML/MIN/1.73SQ M
GLUCOSE P FAST SERPL-MCNC: 91 MG/DL (ref 70–99)
HCT VFR BLD AUTO: 38.2 % (ref 41–53)
HDLC SERPL-MCNC: 62 MG/DL
HGB BLD-MCNC: 12 G/DL (ref 13.5–17.5)
LDLC SERPL CALC-MCNC: 120 MG/DL
LYMPHOCYTES # BLD AUTO: 1.3 THOUSANDS/ΜL (ref 0.5–4)
LYMPHOCYTES NFR BLD AUTO: 29 % (ref 25–45)
MCH RBC QN AUTO: 21.3 PG (ref 26–34)
MCHC RBC AUTO-ENTMCNC: 31.4 G/DL (ref 31–36)
MCV RBC AUTO: 68 FL (ref 80–100)
MICROCYTES BLD QL AUTO: PRESENT
MONOCYTES # BLD AUTO: 0.4 THOUSAND/ΜL (ref 0.2–0.9)
MONOCYTES NFR BLD AUTO: 10 % (ref 1–10)
NEUTROPHILS # BLD AUTO: 2.4 THOUSANDS/ΜL (ref 1.8–7.8)
NEUTS SEG NFR BLD AUTO: 55 % (ref 45–65)
NONHDLC SERPL-MCNC: 131 MG/DL
OVALOCYTES BLD QL SMEAR: PRESENT
PLATELET # BLD AUTO: 142 THOUSANDS/UL (ref 150–450)
PLATELET BLD QL SMEAR: ABNORMAL
PMV BLD AUTO: 9.6 FL (ref 8.9–12.7)
POIKILOCYTOSIS BLD QL SMEAR: PRESENT
POTASSIUM SERPL-SCNC: 3.8 MMOL/L (ref 3.6–5)
PROT SERPL-MCNC: 8.2 G/DL (ref 5.9–8.4)
RBC # BLD AUTO: 5.63 MILLION/UL (ref 4.5–5.9)
RBC MORPH BLD: ABNORMAL
SCHISTOCYTES BLD QL SMEAR: PRESENT
SODIUM SERPL-SCNC: 139 MMOL/L (ref 137–147)
TRIGL SERPL-MCNC: 53 MG/DL
TSH SERPL DL<=0.05 MIU/L-ACNC: 0.68 UIU/ML (ref 0.47–4.68)
WBC # BLD AUTO: 4.4 THOUSAND/UL (ref 4.5–11)

## 2020-01-24 PROCEDURE — 80053 COMPREHEN METABOLIC PANEL: CPT

## 2020-01-24 PROCEDURE — 80061 LIPID PANEL: CPT

## 2020-01-24 PROCEDURE — 36415 COLL VENOUS BLD VENIPUNCTURE: CPT

## 2020-01-24 PROCEDURE — 84443 ASSAY THYROID STIM HORMONE: CPT

## 2020-01-24 PROCEDURE — 85025 COMPLETE CBC W/AUTO DIFF WBC: CPT

## 2020-01-27 DIAGNOSIS — D50.9 MICROCYTIC ANEMIA: Primary | ICD-10-CM

## 2020-04-02 ENCOUNTER — TELEMEDICINE (OUTPATIENT)
Dept: FAMILY MEDICINE CLINIC | Facility: CLINIC | Age: 40
End: 2020-04-02
Payer: COMMERCIAL

## 2020-04-02 VITALS — HEIGHT: 68 IN | WEIGHT: 170 LBS | BODY MASS INDEX: 25.76 KG/M2

## 2020-04-02 DIAGNOSIS — R07.81 RIB PAIN ON LEFT SIDE: ICD-10-CM

## 2020-04-02 DIAGNOSIS — K21.9 GASTROESOPHAGEAL REFLUX DISEASE, ESOPHAGITIS PRESENCE NOT SPECIFIED: Primary | ICD-10-CM

## 2020-04-02 PROCEDURE — 99214 OFFICE O/P EST MOD 30 MIN: CPT | Performed by: FAMILY MEDICINE

## 2020-04-02 RX ORDER — METHOCARBAMOL 500 MG/1
TABLET, FILM COATED ORAL
Qty: 30 TABLET | Refills: 1 | Status: SHIPPED | OUTPATIENT
Start: 2020-04-02 | End: 2021-10-07 | Stop reason: HOSPADM

## 2020-04-02 RX ORDER — OMEPRAZOLE 20 MG/1
20 CAPSULE, DELAYED RELEASE ORAL DAILY
Qty: 30 CAPSULE | Refills: 3 | Status: SHIPPED | OUTPATIENT
Start: 2020-04-02 | End: 2021-10-07 | Stop reason: HOSPADM

## 2020-09-19 ENCOUNTER — TRANSCRIBE ORDERS (OUTPATIENT)
Dept: URGENT CARE | Facility: MEDICAL CENTER | Age: 40
End: 2020-09-19

## 2020-09-19 ENCOUNTER — APPOINTMENT (OUTPATIENT)
Dept: URGENT CARE | Facility: MEDICAL CENTER | Age: 40
End: 2020-09-19

## 2020-09-19 DIAGNOSIS — Z11.59 SPECIAL SCREENING EXAMINATION FOR UNSPECIFIED VIRAL DISEASE: Primary | ICD-10-CM

## 2020-09-19 PROCEDURE — U0003 INFECTIOUS AGENT DETECTION BY NUCLEIC ACID (DNA OR RNA); SEVERE ACUTE RESPIRATORY SYNDROME CORONAVIRUS 2 (SARS-COV-2) (CORONAVIRUS DISEASE [COVID-19]), AMPLIFIED PROBE TECHNIQUE, MAKING USE OF HIGH THROUGHPUT TECHNOLOGIES AS DESCRIBED BY CMS-2020-01-R: HCPCS | Performed by: FAMILY MEDICINE

## 2020-09-21 LAB — SARS-COV-2 RNA SPEC QL NAA+PROBE: NOT DETECTED

## 2021-10-04 ENCOUNTER — OFFICE VISIT (OUTPATIENT)
Dept: FAMILY MEDICINE CLINIC | Facility: CLINIC | Age: 41
End: 2021-10-04
Payer: COMMERCIAL

## 2021-10-04 VITALS
TEMPERATURE: 97.6 F | WEIGHT: 176 LBS | DIASTOLIC BLOOD PRESSURE: 68 MMHG | HEIGHT: 68 IN | OXYGEN SATURATION: 99 % | SYSTOLIC BLOOD PRESSURE: 110 MMHG | RESPIRATION RATE: 16 BRPM | BODY MASS INDEX: 26.67 KG/M2 | HEART RATE: 76 BPM

## 2021-10-04 DIAGNOSIS — R01.1 HEART MURMUR: ICD-10-CM

## 2021-10-04 DIAGNOSIS — R10.12 LEFT UPPER QUADRANT ABDOMINAL PAIN: ICD-10-CM

## 2021-10-04 DIAGNOSIS — D64.9 ANEMIA, UNSPECIFIED TYPE: ICD-10-CM

## 2021-10-04 DIAGNOSIS — R53.83 FATIGUE, UNSPECIFIED TYPE: ICD-10-CM

## 2021-10-04 DIAGNOSIS — K62.5 RECTAL BLEEDING: ICD-10-CM

## 2021-10-04 DIAGNOSIS — R42 DIZZINESS: Primary | ICD-10-CM

## 2021-10-04 DIAGNOSIS — R06.00 DYSPNEA ON EXERTION: ICD-10-CM

## 2021-10-04 PROBLEM — R06.09 DYSPNEA ON EXERTION: Status: ACTIVE | Noted: 2021-10-04

## 2021-10-04 PROCEDURE — 99215 OFFICE O/P EST HI 40 MIN: CPT | Performed by: FAMILY MEDICINE

## 2021-10-04 PROCEDURE — 93000 ELECTROCARDIOGRAM COMPLETE: CPT | Performed by: FAMILY MEDICINE

## 2021-10-04 PROCEDURE — 3725F SCREEN DEPRESSION PERFORMED: CPT | Performed by: FAMILY MEDICINE

## 2021-10-05 ENCOUNTER — APPOINTMENT (OUTPATIENT)
Dept: LAB | Facility: HOSPITAL | Age: 41
End: 2021-10-05
Payer: COMMERCIAL

## 2021-10-05 ENCOUNTER — TELEPHONE (OUTPATIENT)
Dept: FAMILY MEDICINE CLINIC | Facility: CLINIC | Age: 41
End: 2021-10-05

## 2021-10-05 ENCOUNTER — APPOINTMENT (EMERGENCY)
Dept: CT IMAGING | Facility: HOSPITAL | Age: 41
End: 2021-10-05
Payer: COMMERCIAL

## 2021-10-05 ENCOUNTER — HOSPITAL ENCOUNTER (OUTPATIENT)
Facility: HOSPITAL | Age: 41
Setting detail: OBSERVATION
Discharge: HOME | End: 2021-10-07
Attending: EMERGENCY MEDICINE | Admitting: STUDENT IN AN ORGANIZED HEALTH CARE EDUCATION/TRAINING PROGRAM
Payer: COMMERCIAL

## 2021-10-05 DIAGNOSIS — K92.2 GASTROINTESTINAL HEMORRHAGE, UNSPECIFIED GASTROINTESTINAL HEMORRHAGE TYPE: ICD-10-CM

## 2021-10-05 DIAGNOSIS — D62 ACUTE BLOOD LOSS ANEMIA: Primary | ICD-10-CM

## 2021-10-05 DIAGNOSIS — R42 DIZZINESS: ICD-10-CM

## 2021-10-05 DIAGNOSIS — D64.9 LOW HEMOGLOBIN: Primary | ICD-10-CM

## 2021-10-05 DIAGNOSIS — R53.83 FATIGUE, UNSPECIFIED TYPE: ICD-10-CM

## 2021-10-05 DIAGNOSIS — K62.5 RECTAL BLEEDING: ICD-10-CM

## 2021-10-05 DIAGNOSIS — D64.9 ANEMIA, UNSPECIFIED TYPE: ICD-10-CM

## 2021-10-05 LAB
ABO GROUP BLD: NORMAL
ABO GROUP BLD: NORMAL
ALBUMIN SERPL BCP-MCNC: 4.3 G/DL (ref 3–5.2)
ALP SERPL-CCNC: 44 U/L (ref 43–122)
ALT SERPL W P-5'-P-CCNC: 24 U/L
ANION GAP SERPL CALCULATED.3IONS-SCNC: 7 MMOL/L (ref 5–14)
ANISOCYTOSIS BLD QL SMEAR: PRESENT
AST SERPL W P-5'-P-CCNC: 34 U/L (ref 17–59)
BASOPHILS # BLD AUTO: 0 THOUSANDS/ΜL (ref 0–0.1)
BASOPHILS NFR BLD AUTO: 0 % (ref 0–1)
BILIRUB SERPL-MCNC: 0.87 MG/DL
BLD GP AB SCN SERPL QL: NEGATIVE
BUN SERPL-MCNC: 11 MG/DL (ref 5–25)
CALCIUM SERPL-MCNC: 9.2 MG/DL (ref 8.4–10.2)
CHLORIDE SERPL-SCNC: 104 MMOL/L (ref 97–108)
CO2 SERPL-SCNC: 27 MMOL/L (ref 22–30)
CREAT SERPL-MCNC: 0.94 MG/DL (ref 0.7–1.5)
DACRYOCYTES BLD QL SMEAR: PRESENT
EOSINOPHIL # BLD AUTO: 0.2 THOUSAND/ΜL (ref 0–0.4)
EOSINOPHIL NFR BLD AUTO: 3 % (ref 0–6)
ERYTHROCYTE [DISTWIDTH] IN BLOOD BY AUTOMATED COUNT: 21.7 %
FERRITIN SERPL-MCNC: 3 NG/ML (ref 8–388)
GFR SERPL CREATININE-BSD FRML MDRD: 114 ML/MIN/1.73SQ M
GLUCOSE P FAST SERPL-MCNC: 101 MG/DL (ref 70–99)
HCT VFR BLD AUTO: 19.4 % (ref 41–53)
HGB BLD-MCNC: 5.9 G/DL (ref 13.5–17.5)
HYPERCHROMIA BLD QL SMEAR: PRESENT
IRON SATN MFR SERPL: 3 % (ref 20–50)
IRON SERPL-MCNC: 17 UG/DL (ref 65–175)
LG PLATELETS BLD QL SMEAR: PRESENT
LYMPHOCYTES # BLD AUTO: 0.9 THOUSANDS/ΜL (ref 0.5–4)
LYMPHOCYTES NFR BLD AUTO: 12 % (ref 25–45)
MCH RBC QN AUTO: 17.1 PG (ref 26–34)
MCHC RBC AUTO-ENTMCNC: 30.2 G/DL (ref 31–36)
MCV RBC AUTO: 56 FL (ref 80–100)
MICROCYTES BLD QL AUTO: PRESENT
MONOCYTES # BLD AUTO: 0.5 THOUSAND/ΜL (ref 0.2–0.9)
MONOCYTES NFR BLD AUTO: 6 % (ref 1–10)
NEUTROPHILS # BLD AUTO: 6 THOUSANDS/ΜL (ref 1.8–7.8)
NEUTS SEG NFR BLD AUTO: 78 % (ref 45–65)
OVALOCYTES BLD QL SMEAR: PRESENT
PLATELET # BLD AUTO: 183 THOUSANDS/UL (ref 150–450)
PLATELET BLD QL SMEAR: ADEQUATE
PMV BLD AUTO: 10.1 FL (ref 8.9–12.7)
POIKILOCYTOSIS BLD QL SMEAR: PRESENT
POLYCHROMASIA BLD QL SMEAR: PRESENT
POTASSIUM SERPL-SCNC: 3.9 MMOL/L (ref 3.6–5)
PROT SERPL-MCNC: 7.3 G/DL (ref 5.9–8.4)
RBC # BLD AUTO: 3.44 MILLION/UL (ref 4.5–5.9)
RBC MORPH BLD: NORMAL
RH BLD: POSITIVE
RH BLD: POSITIVE
SCHISTOCYTES BLD QL SMEAR: PRESENT
SODIUM SERPL-SCNC: 138 MMOL/L (ref 137–147)
SPECIMEN EXPIRATION DATE: NORMAL
TIBC SERPL-MCNC: 610 UG/DL (ref 250–450)
TSH SERPL DL<=0.05 MIU/L-ACNC: 1.04 UIU/ML (ref 0.47–4.68)
WBC # BLD AUTO: 7.6 THOUSAND/UL (ref 4.5–11)

## 2021-10-05 PROCEDURE — 86901 BLOOD TYPING SEROLOGIC RH(D): CPT | Performed by: EMERGENCY MEDICINE

## 2021-10-05 PROCEDURE — 82728 ASSAY OF FERRITIN: CPT

## 2021-10-05 PROCEDURE — 84443 ASSAY THYROID STIM HORMONE: CPT

## 2021-10-05 PROCEDURE — 80053 COMPREHEN METABOLIC PANEL: CPT

## 2021-10-05 PROCEDURE — 86850 RBC ANTIBODY SCREEN: CPT | Performed by: EMERGENCY MEDICINE

## 2021-10-05 PROCEDURE — 36415 COLL VENOUS BLD VENIPUNCTURE: CPT

## 2021-10-05 PROCEDURE — 36430 TRANSFUSION BLD/BLD COMPNT: CPT

## 2021-10-05 PROCEDURE — 83540 ASSAY OF IRON: CPT

## 2021-10-05 PROCEDURE — 86920 COMPATIBILITY TEST SPIN: CPT

## 2021-10-05 PROCEDURE — 99285 EMERGENCY DEPT VISIT HI MDM: CPT | Performed by: EMERGENCY MEDICINE

## 2021-10-05 PROCEDURE — P9016 RBC LEUKOCYTES REDUCED: HCPCS

## 2021-10-05 PROCEDURE — 83550 IRON BINDING TEST: CPT

## 2021-10-05 PROCEDURE — 99285 EMERGENCY DEPT VISIT HI MDM: CPT

## 2021-10-05 PROCEDURE — 74177 CT ABD & PELVIS W/CONTRAST: CPT

## 2021-10-05 PROCEDURE — 86900 BLOOD TYPING SEROLOGIC ABO: CPT | Performed by: EMERGENCY MEDICINE

## 2021-10-05 PROCEDURE — 85025 COMPLETE CBC W/AUTO DIFF WBC: CPT

## 2021-10-05 RX ADMIN — IOHEXOL 100 ML: 350 INJECTION, SOLUTION INTRAVENOUS at 20:10

## 2021-10-06 ENCOUNTER — TELEPHONE (OUTPATIENT)
Dept: FAMILY MEDICINE CLINIC | Facility: CLINIC | Age: 41
End: 2021-10-06

## 2021-10-06 PROBLEM — D62 ACUTE BLOOD LOSS ANEMIA: Status: ACTIVE | Noted: 2021-10-06

## 2021-10-06 LAB
ABO GROUP BLD BPU: NORMAL
ABO GROUP BLD BPU: NORMAL
BPU ID: NORMAL
BPU ID: NORMAL
CROSSMATCH: NORMAL
CROSSMATCH: NORMAL
HCT VFR BLD AUTO: 26.4 % (ref 41–53)
HGB BLD-MCNC: 8.4 G/DL (ref 13.5–17.5)
UNIT DISPENSE STATUS: NORMAL
UNIT DISPENSE STATUS: NORMAL
UNIT PRODUCT CODE: NORMAL
UNIT PRODUCT CODE: NORMAL
UNIT PRODUCT VOLUME: 350 ML
UNIT PRODUCT VOLUME: 350 ML
UNIT RH: NORMAL
UNIT RH: NORMAL

## 2021-10-06 PROCEDURE — 85018 HEMOGLOBIN: CPT | Performed by: EMERGENCY MEDICINE

## 2021-10-06 PROCEDURE — 36415 COLL VENOUS BLD VENIPUNCTURE: CPT | Performed by: EMERGENCY MEDICINE

## 2021-10-06 PROCEDURE — 96374 THER/PROPH/DIAG INJ IV PUSH: CPT

## 2021-10-06 PROCEDURE — 99219 PR INITIAL OBSERVATION CARE/DAY 50 MINUTES: CPT | Performed by: STUDENT IN AN ORGANIZED HEALTH CARE EDUCATION/TRAINING PROGRAM

## 2021-10-06 PROCEDURE — 85014 HEMATOCRIT: CPT | Performed by: EMERGENCY MEDICINE

## 2021-10-06 RX ORDER — SODIUM CHLORIDE, SODIUM GLUCONATE, SODIUM ACETATE, POTASSIUM CHLORIDE, MAGNESIUM CHLORIDE, SODIUM PHOSPHATE, DIBASIC, AND POTASSIUM PHOSPHATE .53; .5; .37; .037; .03; .012; .00082 G/100ML; G/100ML; G/100ML; G/100ML; G/100ML; G/100ML; G/100ML
50 INJECTION, SOLUTION INTRAVENOUS CONTINUOUS
Status: DISPENSED | OUTPATIENT
Start: 2021-10-06 | End: 2021-10-07

## 2021-10-06 RX ORDER — POLYETHYLENE GLYCOL 3350 17 G/17G
238 POWDER, FOR SOLUTION ORAL ONCE
Status: DISCONTINUED | OUTPATIENT
Start: 2021-10-06 | End: 2021-10-06 | Stop reason: CLARIF

## 2021-10-06 RX ORDER — POLYETHYLENE GLYCOL 3350, SODIUM CHLORIDE, SODIUM BICARBONATE, POTASSIUM CHLORIDE 420; 11.2; 5.72; 1.48 G/4L; G/4L; G/4L; G/4L
4000 POWDER, FOR SOLUTION ORAL ONCE
Status: COMPLETED | OUTPATIENT
Start: 2021-10-06 | End: 2021-10-06

## 2021-10-06 RX ADMIN — MORPHINE SULFATE 2 MG: 2 INJECTION, SOLUTION INTRAMUSCULAR; INTRAVENOUS at 00:59

## 2021-10-06 RX ADMIN — POLYETHYLENE GLYCOL 3350, SODIUM CHLORIDE, SODIUM BICARBONATE AND POTASSIUM CHLORIDE WITH LEMON FLAVOR 4000 ML: 420; 11.2; 5.72; 1.48 POWDER, FOR SOLUTION ORAL at 20:28

## 2021-10-06 RX ADMIN — SODIUM CHLORIDE, SODIUM GLUCONATE, SODIUM ACETATE, POTASSIUM CHLORIDE, MAGNESIUM CHLORIDE, SODIUM PHOSPHATE, DIBASIC, AND POTASSIUM PHOSPHATE 50 ML/HR: .53; .5; .37; .037; .03; .012; .00082 INJECTION, SOLUTION INTRAVENOUS at 16:20

## 2021-10-07 ENCOUNTER — APPOINTMENT (OUTPATIENT)
Dept: GASTROENTEROLOGY | Facility: HOSPITAL | Age: 41
End: 2021-10-07
Attending: INTERNAL MEDICINE
Payer: COMMERCIAL

## 2021-10-07 ENCOUNTER — ANESTHESIA EVENT (OUTPATIENT)
Dept: GASTROENTEROLOGY | Facility: HOSPITAL | Age: 41
End: 2021-10-07
Payer: COMMERCIAL

## 2021-10-07 ENCOUNTER — TELEPHONE (OUTPATIENT)
Dept: GASTROENTEROLOGY | Facility: HOSPITAL | Age: 41
End: 2021-10-07

## 2021-10-07 ENCOUNTER — ANESTHESIA (OUTPATIENT)
Dept: GASTROENTEROLOGY | Facility: HOSPITAL | Age: 41
End: 2021-10-07
Payer: COMMERCIAL

## 2021-10-07 VITALS
DIASTOLIC BLOOD PRESSURE: 69 MMHG | BODY MASS INDEX: 26.68 KG/M2 | SYSTOLIC BLOOD PRESSURE: 108 MMHG | HEART RATE: 69 BPM | OXYGEN SATURATION: 100 % | WEIGHT: 174.16 LBS | RESPIRATION RATE: 18 BRPM | TEMPERATURE: 96.6 F

## 2021-10-07 PROBLEM — G89.29 CHRONIC LOW BACK PAIN: Status: ACTIVE | Noted: 2021-10-07

## 2021-10-07 PROBLEM — M54.50 CHRONIC LOW BACK PAIN: Status: ACTIVE | Noted: 2021-10-07

## 2021-10-07 LAB
ANION GAP SERPL CALCULATED.3IONS-SCNC: 6 MMOL/L (ref 5–14)
BUN SERPL-MCNC: 12 MG/DL (ref 5–25)
CALCIUM SERPL-MCNC: 8.8 MG/DL (ref 8.4–10.2)
CHLORIDE SERPL-SCNC: 107 MMOL/L (ref 97–108)
CO2 SERPL-SCNC: 24 MMOL/L (ref 22–30)
CREAT SERPL-MCNC: 0.95 MG/DL (ref 0.7–1.5)
ERYTHROCYTE [DISTWIDTH] IN BLOOD BY AUTOMATED COUNT: 31.2 %
GFR SERPL CREATININE-BSD FRML MDRD: 113 ML/MIN/1.73SQ M
GLUCOSE P FAST SERPL-MCNC: 90 MG/DL (ref 70–99)
GLUCOSE SERPL-MCNC: 90 MG/DL (ref 70–99)
HCT VFR BLD AUTO: 26.5 % (ref 41–53)
HGB BLD-MCNC: 8.3 G/DL (ref 13.5–17.5)
MCH RBC QN AUTO: 20 PG (ref 26–34)
MCHC RBC AUTO-ENTMCNC: 31.2 G/DL (ref 31–36)
MCV RBC AUTO: 64 FL (ref 80–100)
PLATELET # BLD AUTO: 102 THOUSANDS/UL (ref 150–450)
PMV BLD AUTO: 10.3 FL (ref 8.9–12.7)
POTASSIUM SERPL-SCNC: 3.9 MMOL/L (ref 3.6–5)
RBC # BLD AUTO: 4.14 MILLION/UL (ref 4.5–5.9)
SODIUM SERPL-SCNC: 137 MMOL/L (ref 137–147)
WBC # BLD AUTO: 5.7 THOUSAND/UL (ref 4.5–11)

## 2021-10-07 PROCEDURE — 85027 COMPLETE CBC AUTOMATED: CPT | Performed by: STUDENT IN AN ORGANIZED HEALTH CARE EDUCATION/TRAINING PROGRAM

## 2021-10-07 PROCEDURE — 99217 PR OBSERVATION CARE DISCHARGE MANAGEMENT: CPT | Performed by: STUDENT IN AN ORGANIZED HEALTH CARE EDUCATION/TRAINING PROGRAM

## 2021-10-07 PROCEDURE — 45378 DIAGNOSTIC COLONOSCOPY: CPT | Performed by: INTERNAL MEDICINE

## 2021-10-07 PROCEDURE — 80048 BASIC METABOLIC PNL TOTAL CA: CPT | Performed by: STUDENT IN AN ORGANIZED HEALTH CARE EDUCATION/TRAINING PROGRAM

## 2021-10-07 PROCEDURE — 99222 1ST HOSP IP/OBS MODERATE 55: CPT | Performed by: INTERNAL MEDICINE

## 2021-10-07 RX ORDER — HYDROCORTISONE ACETATE 25 MG/1
25 SUPPOSITORY RECTAL 2 TIMES DAILY
Status: DISCONTINUED | OUTPATIENT
Start: 2021-10-07 | End: 2021-10-07 | Stop reason: HOSPADM

## 2021-10-07 RX ORDER — SODIUM CHLORIDE 9 MG/ML
125 INJECTION, SOLUTION INTRAVENOUS CONTINUOUS
Status: CANCELLED | OUTPATIENT
Start: 2021-10-07

## 2021-10-07 RX ORDER — PROPOFOL 10 MG/ML
INJECTION, EMULSION INTRAVENOUS AS NEEDED
Status: DISCONTINUED | OUTPATIENT
Start: 2021-10-07 | End: 2021-10-07

## 2021-10-07 RX ORDER — SODIUM CHLORIDE 9 MG/ML
INJECTION, SOLUTION INTRAVENOUS CONTINUOUS PRN
Status: DISCONTINUED | OUTPATIENT
Start: 2021-10-07 | End: 2021-10-07

## 2021-10-07 RX ORDER — HYDROCORTISONE ACETATE 25 MG/1
25 SUPPOSITORY RECTAL 2 TIMES DAILY
Qty: 12 SUPPOSITORY | Refills: 0 | Status: SHIPPED | OUTPATIENT
Start: 2021-10-07 | End: 2021-10-19 | Stop reason: SDUPTHER

## 2021-10-07 RX ORDER — EPHEDRINE SULFATE 50 MG/ML
INJECTION INTRAVENOUS AS NEEDED
Status: DISCONTINUED | OUTPATIENT
Start: 2021-10-07 | End: 2021-10-07

## 2021-10-07 RX ORDER — FERROUS SULFATE TAB EC 324 MG (65 MG FE EQUIVALENT) 324 (65 FE) MG
324 TABLET DELAYED RESPONSE ORAL EVERY OTHER DAY
Qty: 30 TABLET | Refills: 0 | Status: SHIPPED | OUTPATIENT
Start: 2021-10-07 | End: 2021-10-19 | Stop reason: SDUPTHER

## 2021-10-07 RX ORDER — LIDOCAINE HYDROCHLORIDE 10 MG/ML
INJECTION, SOLUTION EPIDURAL; INFILTRATION; INTRACAUDAL; PERINEURAL AS NEEDED
Status: DISCONTINUED | OUTPATIENT
Start: 2021-10-07 | End: 2021-10-07

## 2021-10-07 RX ADMIN — EPHEDRINE SULFATE 10 MG: 50 INJECTION, SOLUTION INTRAVENOUS at 10:36

## 2021-10-07 RX ADMIN — PROPOFOL 50 MG: 10 INJECTION, EMULSION INTRAVENOUS at 10:27

## 2021-10-07 RX ADMIN — PROPOFOL 100 MG: 10 INJECTION, EMULSION INTRAVENOUS at 10:21

## 2021-10-07 RX ADMIN — PROPOFOL 20 MG: 10 INJECTION, EMULSION INTRAVENOUS at 10:28

## 2021-10-07 RX ADMIN — PROPOFOL 50 MG: 10 INJECTION, EMULSION INTRAVENOUS at 10:22

## 2021-10-07 RX ADMIN — HYDROCORTISONE ACETATE 25 MG: 25 SUPPOSITORY RECTAL at 13:19

## 2021-10-07 RX ADMIN — PROPOFOL 50 MG: 10 INJECTION, EMULSION INTRAVENOUS at 10:24

## 2021-10-07 RX ADMIN — LIDOCAINE HYDROCHLORIDE 50 MG: 10 INJECTION, SOLUTION EPIDURAL; INFILTRATION; INTRACAUDAL; PERINEURAL at 10:21

## 2021-10-07 RX ADMIN — SODIUM CHLORIDE: 9 INJECTION, SOLUTION INTRAVENOUS at 10:21

## 2021-10-08 ENCOUNTER — TRANSITIONAL CARE MANAGEMENT (OUTPATIENT)
Dept: FAMILY MEDICINE CLINIC | Facility: CLINIC | Age: 41
End: 2021-10-08

## 2021-10-17 PROBLEM — E61.1 IRON DEFICIENCY: Status: ACTIVE | Noted: 2021-10-17

## 2021-10-17 PROBLEM — D69.6 THROMBOCYTOPENIA (HCC): Status: ACTIVE | Noted: 2021-10-17

## 2021-10-17 PROBLEM — K64.8 INTERNAL HEMORRHOIDS: Status: ACTIVE | Noted: 2021-10-17

## 2021-10-19 ENCOUNTER — OFFICE VISIT (OUTPATIENT)
Dept: FAMILY MEDICINE CLINIC | Facility: CLINIC | Age: 41
End: 2021-10-19
Payer: COMMERCIAL

## 2021-10-19 VITALS
OXYGEN SATURATION: 97 % | DIASTOLIC BLOOD PRESSURE: 70 MMHG | HEIGHT: 68 IN | SYSTOLIC BLOOD PRESSURE: 118 MMHG | TEMPERATURE: 97.1 F | BODY MASS INDEX: 26.98 KG/M2 | WEIGHT: 178 LBS | HEART RATE: 84 BPM | RESPIRATION RATE: 14 BRPM

## 2021-10-19 DIAGNOSIS — R01.1 HEART MURMUR: ICD-10-CM

## 2021-10-19 DIAGNOSIS — D69.6 THROMBOCYTOPENIA (HCC): ICD-10-CM

## 2021-10-19 DIAGNOSIS — Z23 NEED FOR INFLUENZA VACCINATION: ICD-10-CM

## 2021-10-19 DIAGNOSIS — D62 ACUTE BLOOD LOSS ANEMIA: Primary | ICD-10-CM

## 2021-10-19 DIAGNOSIS — K64.8 INTERNAL HEMORRHOIDS: ICD-10-CM

## 2021-10-19 DIAGNOSIS — E61.1 IRON DEFICIENCY: ICD-10-CM

## 2021-10-19 DIAGNOSIS — K62.5 RECTAL BLEEDING: ICD-10-CM

## 2021-10-19 PROCEDURE — 1111F DSCHRG MED/CURRENT MED MERGE: CPT | Performed by: FAMILY MEDICINE

## 2021-10-19 PROCEDURE — 3008F BODY MASS INDEX DOCD: CPT | Performed by: FAMILY MEDICINE

## 2021-10-19 PROCEDURE — 99495 TRANSJ CARE MGMT MOD F2F 14D: CPT | Performed by: FAMILY MEDICINE

## 2021-10-19 RX ORDER — FERROUS SULFATE TAB EC 324 MG (65 MG FE EQUIVALENT) 324 (65 FE) MG
324 TABLET DELAYED RESPONSE ORAL EVERY OTHER DAY
Qty: 30 TABLET | Refills: 5 | Status: SHIPPED | OUTPATIENT
Start: 2021-10-19

## 2021-10-19 RX ORDER — HYDROCORTISONE ACETATE 25 MG/1
25 SUPPOSITORY RECTAL 2 TIMES DAILY
Qty: 20 SUPPOSITORY | Refills: 0 | Status: SHIPPED | OUTPATIENT
Start: 2021-10-19

## 2021-10-20 ENCOUNTER — TELEPHONE (OUTPATIENT)
Dept: HEMATOLOGY ONCOLOGY | Facility: CLINIC | Age: 41
End: 2021-10-20

## 2021-10-25 ENCOUNTER — HOSPITAL ENCOUNTER (OUTPATIENT)
Dept: NON INVASIVE DIAGNOSTICS | Facility: HOSPITAL | Age: 41
Discharge: HOME | End: 2021-10-25
Payer: COMMERCIAL

## 2021-10-25 VITALS
SYSTOLIC BLOOD PRESSURE: 118 MMHG | HEART RATE: 62 BPM | WEIGHT: 178 LBS | DIASTOLIC BLOOD PRESSURE: 70 MMHG | BODY MASS INDEX: 27.94 KG/M2 | HEIGHT: 67 IN

## 2021-10-25 DIAGNOSIS — R06.00 DYSPNEA ON EXERTION: ICD-10-CM

## 2021-10-25 DIAGNOSIS — R42 DIZZINESS: ICD-10-CM

## 2021-10-25 DIAGNOSIS — R01.1 HEART MURMUR: ICD-10-CM

## 2021-10-25 LAB
AORTIC ROOT: 3.3 CM
APICAL FOUR CHAMBER EJECTION FRACTION: 62 %
ASCENDING AORTA: 3.4 CM
E WAVE DECELERATION TIME: 163 MS
FRACTIONAL SHORTENING: 37 % (ref 28–44)
INTERVENTRICULAR SEPTUM IN DIASTOLE (PARASTERNAL SHORT AXIS VIEW): 1 CM
LEFT INTERNAL DIMENSION IN SYSTOLE: 3.1 CM (ref 2.1–4)
LEFT VENTRICULAR INTERNAL DIMENSION IN DIASTOLE: 4.9 CM (ref 4.64–6.91)
LEFT VENTRICULAR POSTERIOR WALL IN END DIASTOLE: 1.3 CM
LEFT VENTRICULAR STROKE VOLUME: 74 ML
LV EF: 62 %
MV E'TISSUE VEL-SEP: 10 CM/S
MV PEAK A VEL: 0.66 M/S
MV PEAK E VEL: 113 CM/S
MV STENOSIS PRESSURE HALF TIME: 0 MS
RIGHT VENTRICLE ID DIMENSION: 4 CM
SL CV PED ECHO LEFT VENTRICLE DIASTOLIC VOLUME (MOD BIPLANE) 2D: 113 ML
SL CV PED ECHO LEFT VENTRICLE SYSTOLIC VOLUME (MOD BIPLANE) 2D: 38 ML
TRICUSPID VALVE S': 1.1 CM/S
Z-SCORE OF LEFT VENTRICULAR DIMENSION IN END SYSTOLE: -1.45

## 2021-10-25 PROCEDURE — 93306 TTE W/DOPPLER COMPLETE: CPT

## 2021-10-25 PROCEDURE — 93306 TTE W/DOPPLER COMPLETE: CPT | Performed by: INTERNAL MEDICINE

## 2021-10-26 ENCOUNTER — OFFICE VISIT (OUTPATIENT)
Dept: URGENT CARE | Age: 41
End: 2021-10-26
Payer: COMMERCIAL

## 2021-10-26 VITALS
OXYGEN SATURATION: 99 % | WEIGHT: 180 LBS | TEMPERATURE: 98.1 F | HEIGHT: 66 IN | HEART RATE: 58 BPM | RESPIRATION RATE: 16 BRPM | DIASTOLIC BLOOD PRESSURE: 79 MMHG | BODY MASS INDEX: 28.93 KG/M2 | SYSTOLIC BLOOD PRESSURE: 120 MMHG

## 2021-10-26 DIAGNOSIS — Z71.1 WORRIED WELL: Primary | ICD-10-CM

## 2021-10-26 PROCEDURE — 99203 OFFICE O/P NEW LOW 30 MIN: CPT | Performed by: PHYSICIAN ASSISTANT

## 2021-11-12 ENCOUNTER — CONSULT (OUTPATIENT)
Dept: HEMATOLOGY ONCOLOGY | Facility: CLINIC | Age: 41
End: 2021-11-12
Payer: COMMERCIAL

## 2021-11-12 ENCOUNTER — APPOINTMENT (OUTPATIENT)
Dept: LAB | Facility: HOSPITAL | Age: 41
End: 2021-11-12
Attending: INTERNAL MEDICINE
Payer: COMMERCIAL

## 2021-11-12 VITALS
WEIGHT: 184.4 LBS | HEIGHT: 66 IN | TEMPERATURE: 98.2 F | OXYGEN SATURATION: 97 % | BODY MASS INDEX: 29.63 KG/M2 | DIASTOLIC BLOOD PRESSURE: 74 MMHG | SYSTOLIC BLOOD PRESSURE: 104 MMHG | RESPIRATION RATE: 18 BRPM | HEART RATE: 71 BPM

## 2021-11-12 DIAGNOSIS — E61.1 IRON DEFICIENCY: ICD-10-CM

## 2021-11-12 DIAGNOSIS — D69.6 THROMBOCYTOPENIA (HCC): ICD-10-CM

## 2021-11-12 DIAGNOSIS — D50.0 IRON DEFICIENCY ANEMIA DUE TO CHRONIC BLOOD LOSS: Primary | ICD-10-CM

## 2021-11-12 LAB
ALBUMIN SERPL BCP-MCNC: 4.1 G/DL (ref 3–5.2)
ALP SERPL-CCNC: 54 U/L (ref 43–122)
ALT SERPL W P-5'-P-CCNC: 59 U/L
ANION GAP SERPL CALCULATED.3IONS-SCNC: 5 MMOL/L (ref 5–14)
ANISOCYTOSIS BLD QL SMEAR: PRESENT
AST SERPL W P-5'-P-CCNC: 161 U/L (ref 17–59)
BASOPHILS # BLD AUTO: 0 THOUSANDS/ΜL (ref 0–0.1)
BASOPHILS NFR BLD AUTO: 0 % (ref 0–1)
BILIRUB SERPL-MCNC: 1.03 MG/DL
BUN SERPL-MCNC: 12 MG/DL (ref 5–25)
CALCIUM SERPL-MCNC: 9.2 MG/DL (ref 8.4–10.2)
CHLORIDE SERPL-SCNC: 104 MMOL/L (ref 97–108)
CO2 SERPL-SCNC: 31 MMOL/L (ref 22–30)
CREAT SERPL-MCNC: 0.92 MG/DL (ref 0.7–1.5)
DACRYOCYTES BLD QL SMEAR: PRESENT
EOSINOPHIL # BLD AUTO: 0.2 THOUSAND/ΜL (ref 0–0.4)
EOSINOPHIL NFR BLD AUTO: 4 % (ref 0–6)
ERYTHROCYTE [DISTWIDTH] IN BLOOD BY AUTOMATED COUNT: 26.6 %
FERRITIN SERPL-MCNC: 5 NG/ML (ref 8–388)
GFR SERPL CREATININE-BSD FRML MDRD: 117 ML/MIN/1.73SQ M
GIANT PLATELETS BLD QL SMEAR: PRESENT
GLUCOSE SERPL-MCNC: 92 MG/DL (ref 70–99)
HCT VFR BLD AUTO: 26.9 % (ref 41–53)
HGB BLD-MCNC: 8.3 G/DL (ref 13.5–17.5)
HYPERCHROMIA BLD QL SMEAR: PRESENT
IRON SATN MFR SERPL: 4 % (ref 20–50)
IRON SERPL-MCNC: 22 UG/DL (ref 65–175)
LDH SERPL-CCNC: 675 U/L (ref 313–618)
LYMPHOCYTES # BLD AUTO: 1.3 THOUSANDS/ΜL (ref 0.5–4)
LYMPHOCYTES NFR BLD AUTO: 23 % (ref 25–45)
MAGNESIUM SERPL-MCNC: 1.8 MG/DL (ref 1.6–2.3)
MCH RBC QN AUTO: 18.5 PG (ref 26–34)
MCHC RBC AUTO-ENTMCNC: 31.1 G/DL (ref 31–36)
MCV RBC AUTO: 59 FL (ref 80–100)
MICROCYTES BLD QL AUTO: PRESENT
MONOCYTES # BLD AUTO: 0.6 THOUSAND/ΜL (ref 0.2–0.9)
MONOCYTES NFR BLD AUTO: 10 % (ref 1–10)
NEUTROPHILS # BLD AUTO: 3.6 THOUSANDS/ΜL (ref 1.8–7.8)
NEUTS SEG NFR BLD AUTO: 63 % (ref 45–65)
OVALOCYTES BLD QL SMEAR: PRESENT
PLATELET # BLD AUTO: 222 THOUSANDS/UL (ref 150–450)
PMV BLD AUTO: 9.9 FL (ref 8.9–12.7)
POIKILOCYTOSIS BLD QL SMEAR: PRESENT
POTASSIUM SERPL-SCNC: 3.7 MMOL/L (ref 3.6–5)
PROT SERPL-MCNC: 7.7 G/DL (ref 5.9–8.4)
RBC # BLD AUTO: 4.52 MILLION/UL (ref 4.5–5.9)
RBC MORPH BLD: NORMAL
SCHISTOCYTES BLD QL SMEAR: PRESENT
SODIUM SERPL-SCNC: 140 MMOL/L (ref 137–147)
TIBC SERPL-MCNC: 580 UG/DL (ref 250–450)
VIT B12 SERPL-MCNC: 421 PG/ML (ref 100–900)
WBC # BLD AUTO: 5.7 THOUSAND/UL (ref 4.5–11)

## 2021-11-12 PROCEDURE — 36415 COLL VENOUS BLD VENIPUNCTURE: CPT

## 2021-11-12 PROCEDURE — 3008F BODY MASS INDEX DOCD: CPT | Performed by: INTERNAL MEDICINE

## 2021-11-12 PROCEDURE — 83615 LACTATE (LD) (LDH) ENZYME: CPT

## 2021-11-12 PROCEDURE — 83550 IRON BINDING TEST: CPT

## 2021-11-12 PROCEDURE — 83540 ASSAY OF IRON: CPT

## 2021-11-12 PROCEDURE — 85025 COMPLETE CBC W/AUTO DIFF WBC: CPT

## 2021-11-12 PROCEDURE — 83735 ASSAY OF MAGNESIUM: CPT

## 2021-11-12 PROCEDURE — 99244 OFF/OP CNSLTJ NEW/EST MOD 40: CPT | Performed by: INTERNAL MEDICINE

## 2021-11-12 PROCEDURE — 82607 VITAMIN B-12: CPT

## 2021-11-12 PROCEDURE — 82728 ASSAY OF FERRITIN: CPT

## 2021-11-12 PROCEDURE — 80053 COMPREHEN METABOLIC PANEL: CPT

## 2021-11-12 RX ORDER — SODIUM CHLORIDE 9 MG/ML
20 INJECTION, SOLUTION INTRAVENOUS ONCE
Status: CANCELLED | OUTPATIENT
Start: 2021-11-26

## 2021-11-26 ENCOUNTER — HOSPITAL ENCOUNTER (OUTPATIENT)
Dept: INFUSION CENTER | Facility: HOSPITAL | Age: 41
Discharge: HOME | End: 2021-11-26
Attending: INTERNAL MEDICINE
Payer: COMMERCIAL

## 2021-11-26 VITALS
TEMPERATURE: 97.7 F | RESPIRATION RATE: 18 BRPM | DIASTOLIC BLOOD PRESSURE: 78 MMHG | SYSTOLIC BLOOD PRESSURE: 137 MMHG | HEART RATE: 66 BPM

## 2021-11-26 DIAGNOSIS — D50.0 IRON DEFICIENCY ANEMIA DUE TO CHRONIC BLOOD LOSS: Primary | ICD-10-CM

## 2021-11-26 PROCEDURE — 96365 THER/PROPH/DIAG IV INF INIT: CPT

## 2021-11-26 RX ORDER — SODIUM CHLORIDE 9 MG/ML
20 INJECTION, SOLUTION INTRAVENOUS ONCE
Status: CANCELLED | OUTPATIENT
Start: 2021-12-03

## 2021-11-26 RX ORDER — SODIUM CHLORIDE 9 MG/ML
20 INJECTION, SOLUTION INTRAVENOUS ONCE
Status: COMPLETED | OUTPATIENT
Start: 2021-11-26 | End: 2021-11-26

## 2021-11-26 RX ADMIN — SODIUM CHLORIDE 20 ML/HR: 0.9 INJECTION, SOLUTION INTRAVENOUS at 14:19

## 2021-11-26 RX ADMIN — IRON SUCROSE 200 MG: 20 INJECTION, SOLUTION INTRAVENOUS at 14:19

## 2021-12-02 ENCOUNTER — OFFICE VISIT (OUTPATIENT)
Dept: DENTISTRY | Facility: CLINIC | Age: 41
End: 2021-12-02

## 2021-12-02 VITALS — DIASTOLIC BLOOD PRESSURE: 84 MMHG | TEMPERATURE: 96.2 F | SYSTOLIC BLOOD PRESSURE: 148 MMHG | HEART RATE: 55 BPM

## 2021-12-02 DIAGNOSIS — K02.9 CARIES: Primary | ICD-10-CM

## 2021-12-02 PROCEDURE — D0330 PANORAMIC RADIOGRAPHIC IMAGE: HCPCS | Performed by: DENTIST

## 2021-12-02 PROCEDURE — D0270 BITEWING - SINGLE RADIOGRAPHIC IMAGE: HCPCS | Performed by: DENTIST

## 2021-12-02 PROCEDURE — D0220 INTRAORAL - PERIAPICAL FIRST RADIOGRAPHIC IMAGE: HCPCS | Performed by: DENTIST

## 2021-12-02 PROCEDURE — D0140 LIMITED ORAL EVALUATION - PROBLEM FOCUSED: HCPCS | Performed by: DENTIST

## 2021-12-03 ENCOUNTER — OFFICE VISIT (OUTPATIENT)
Dept: DENTISTRY | Facility: CLINIC | Age: 41
End: 2021-12-03

## 2021-12-03 ENCOUNTER — HOSPITAL ENCOUNTER (OUTPATIENT)
Dept: INFUSION CENTER | Facility: HOSPITAL | Age: 41
Discharge: HOME | End: 2021-12-03
Attending: INTERNAL MEDICINE

## 2021-12-03 VITALS — TEMPERATURE: 97.1 F | DIASTOLIC BLOOD PRESSURE: 76 MMHG | SYSTOLIC BLOOD PRESSURE: 136 MMHG | HEART RATE: 69 BPM

## 2021-12-03 DIAGNOSIS — K04.4 ACUTE APICAL PERIODONTITIS OF PULPAL ORIGIN: Primary | ICD-10-CM

## 2021-12-03 PROCEDURE — D7140 EXTRACTION, ERUPTED TOOTH OR EXPOSED ROOT (ELEVATION AND/OR FORCEPS REMOVAL): HCPCS | Performed by: DENTIST

## 2021-12-08 DIAGNOSIS — D50.0 IRON DEFICIENCY ANEMIA DUE TO CHRONIC BLOOD LOSS: Primary | ICD-10-CM

## 2021-12-08 RX ORDER — SODIUM CHLORIDE 9 MG/ML
20 INJECTION, SOLUTION INTRAVENOUS ONCE
Status: CANCELLED | OUTPATIENT
Start: 2021-12-10

## 2021-12-10 ENCOUNTER — HOSPITAL ENCOUNTER (OUTPATIENT)
Dept: INFUSION CENTER | Facility: HOSPITAL | Age: 41
Discharge: HOME | End: 2021-12-10
Attending: INTERNAL MEDICINE
Payer: COMMERCIAL

## 2021-12-10 VITALS
SYSTOLIC BLOOD PRESSURE: 115 MMHG | HEART RATE: 65 BPM | RESPIRATION RATE: 18 BRPM | TEMPERATURE: 97.5 F | DIASTOLIC BLOOD PRESSURE: 59 MMHG

## 2021-12-10 DIAGNOSIS — D50.0 IRON DEFICIENCY ANEMIA DUE TO CHRONIC BLOOD LOSS: Primary | ICD-10-CM

## 2021-12-10 PROCEDURE — 96365 THER/PROPH/DIAG IV INF INIT: CPT

## 2021-12-10 RX ORDER — SODIUM CHLORIDE 9 MG/ML
20 INJECTION, SOLUTION INTRAVENOUS ONCE
Status: CANCELLED | OUTPATIENT
Start: 2021-12-17

## 2021-12-10 RX ORDER — SODIUM CHLORIDE 9 MG/ML
20 INJECTION, SOLUTION INTRAVENOUS ONCE
Status: COMPLETED | OUTPATIENT
Start: 2021-12-10 | End: 2021-12-10

## 2021-12-10 RX ADMIN — SODIUM CHLORIDE 20 ML/HR: 0.9 INJECTION, SOLUTION INTRAVENOUS at 14:17

## 2021-12-10 RX ADMIN — IRON SUCROSE 200 MG: 20 INJECTION, SOLUTION INTRAVENOUS at 14:21

## 2021-12-17 ENCOUNTER — HOSPITAL ENCOUNTER (OUTPATIENT)
Dept: INFUSION CENTER | Facility: HOSPITAL | Age: 41
Discharge: HOME | End: 2021-12-17
Attending: INTERNAL MEDICINE
Payer: COMMERCIAL

## 2021-12-17 VITALS
OXYGEN SATURATION: 98 % | RESPIRATION RATE: 16 BRPM | HEART RATE: 61 BPM | SYSTOLIC BLOOD PRESSURE: 126 MMHG | DIASTOLIC BLOOD PRESSURE: 85 MMHG | TEMPERATURE: 97.8 F

## 2021-12-17 DIAGNOSIS — D50.0 IRON DEFICIENCY ANEMIA DUE TO CHRONIC BLOOD LOSS: Primary | ICD-10-CM

## 2021-12-17 PROCEDURE — 96365 THER/PROPH/DIAG IV INF INIT: CPT

## 2021-12-17 RX ORDER — SODIUM CHLORIDE 9 MG/ML
20 INJECTION, SOLUTION INTRAVENOUS ONCE
Status: COMPLETED | OUTPATIENT
Start: 2021-12-17 | End: 2021-12-17

## 2021-12-17 RX ORDER — SODIUM CHLORIDE 9 MG/ML
20 INJECTION, SOLUTION INTRAVENOUS ONCE
Status: CANCELLED | OUTPATIENT
Start: 2021-12-23

## 2021-12-17 RX ADMIN — SODIUM CHLORIDE 20 ML/HR: 0.9 INJECTION, SOLUTION INTRAVENOUS at 14:15

## 2021-12-17 RX ADMIN — IRON SUCROSE 200 MG: 20 INJECTION, SOLUTION INTRAVENOUS at 14:16

## 2021-12-23 ENCOUNTER — HOSPITAL ENCOUNTER (OUTPATIENT)
Dept: INFUSION CENTER | Facility: HOSPITAL | Age: 41
Discharge: HOME | End: 2021-12-23
Attending: INTERNAL MEDICINE
Payer: COMMERCIAL

## 2021-12-23 VITALS
TEMPERATURE: 97.7 F | RESPIRATION RATE: 18 BRPM | HEART RATE: 18 BPM | SYSTOLIC BLOOD PRESSURE: 129 MMHG | DIASTOLIC BLOOD PRESSURE: 76 MMHG

## 2021-12-23 DIAGNOSIS — D50.0 IRON DEFICIENCY ANEMIA DUE TO CHRONIC BLOOD LOSS: Primary | ICD-10-CM

## 2021-12-23 PROCEDURE — 96365 THER/PROPH/DIAG IV INF INIT: CPT

## 2021-12-23 RX ORDER — SODIUM CHLORIDE 9 MG/ML
20 INJECTION, SOLUTION INTRAVENOUS ONCE
Status: COMPLETED | OUTPATIENT
Start: 2021-12-23 | End: 2021-12-23

## 2021-12-23 RX ORDER — SODIUM CHLORIDE 9 MG/ML
20 INJECTION, SOLUTION INTRAVENOUS ONCE
Status: CANCELLED | OUTPATIENT
Start: 2021-12-30

## 2021-12-23 RX ADMIN — IRON SUCROSE 200 MG: 20 INJECTION, SOLUTION INTRAVENOUS at 14:11

## 2021-12-23 RX ADMIN — SODIUM CHLORIDE 20 ML/HR: 0.9 INJECTION, SOLUTION INTRAVENOUS at 14:10

## 2021-12-30 ENCOUNTER — HOSPITAL ENCOUNTER (OUTPATIENT)
Dept: INFUSION CENTER | Facility: HOSPITAL | Age: 41
Discharge: HOME | End: 2021-12-30
Attending: INTERNAL MEDICINE
Payer: COMMERCIAL

## 2021-12-30 VITALS
DIASTOLIC BLOOD PRESSURE: 65 MMHG | TEMPERATURE: 97.9 F | RESPIRATION RATE: 16 BRPM | SYSTOLIC BLOOD PRESSURE: 146 MMHG | HEART RATE: 59 BPM

## 2021-12-30 DIAGNOSIS — D50.0 IRON DEFICIENCY ANEMIA DUE TO CHRONIC BLOOD LOSS: Primary | ICD-10-CM

## 2021-12-30 PROCEDURE — 96365 THER/PROPH/DIAG IV INF INIT: CPT

## 2021-12-30 RX ORDER — SODIUM CHLORIDE 9 MG/ML
20 INJECTION, SOLUTION INTRAVENOUS ONCE
Status: CANCELLED | OUTPATIENT
Start: 2022-01-07

## 2021-12-30 RX ORDER — SODIUM CHLORIDE 9 MG/ML
20 INJECTION, SOLUTION INTRAVENOUS ONCE
Status: COMPLETED | OUTPATIENT
Start: 2021-12-30 | End: 2021-12-30

## 2021-12-30 RX ADMIN — IRON SUCROSE 200 MG: 20 INJECTION, SOLUTION INTRAVENOUS at 14:05

## 2021-12-30 RX ADMIN — SODIUM CHLORIDE 20 ML/HR: 0.9 INJECTION, SOLUTION INTRAVENOUS at 14:00

## 2021-12-30 NOTE — PROGRESS NOTES
Pt tolerated final venofer infusion without difficulty  Aware of office follow up and to have labs done prior    Left ambulatory with AVS

## 2022-01-17 PROBLEM — R79.89 ELEVATED LFTS: Status: ACTIVE | Noted: 2022-01-17

## 2022-01-17 PROBLEM — D64.9 ANEMIA: Status: RESOLVED | Noted: 2021-10-04 | Resolved: 2022-01-17

## 2022-01-19 ENCOUNTER — TELEPHONE (OUTPATIENT)
Dept: HEMATOLOGY ONCOLOGY | Facility: CLINIC | Age: 42
End: 2022-01-19

## 2022-01-19 NOTE — TELEPHONE ENCOUNTER
I spoke with the patients wife Katharine Vega in regards to the patients lab work that needs to be completed prior to his appointment on 1/21/22 at 1:30pm  Katharine Vega states the patient is going tomorrow to have his blood work completed

## 2022-01-20 ENCOUNTER — APPOINTMENT (OUTPATIENT)
Dept: LAB | Facility: HOSPITAL | Age: 42
End: 2022-01-20
Attending: INTERNAL MEDICINE

## 2022-01-20 DIAGNOSIS — D69.6 THROMBOCYTOPENIA (HCC): ICD-10-CM

## 2022-01-20 DIAGNOSIS — D50.0 IRON DEFICIENCY ANEMIA DUE TO CHRONIC BLOOD LOSS: Primary | ICD-10-CM

## 2022-01-20 LAB
ALBUMIN SERPL BCP-MCNC: 4.5 G/DL (ref 3–5.2)
ALP SERPL-CCNC: 50 U/L (ref 43–122)
ALT SERPL W P-5'-P-CCNC: 38 U/L
ANION GAP SERPL CALCULATED.3IONS-SCNC: 6 MMOL/L (ref 5–14)
ANISOCYTOSIS BLD QL SMEAR: PRESENT
AST SERPL W P-5'-P-CCNC: 57 U/L (ref 17–59)
BASOPHILS # BLD AUTO: 0 THOUSANDS/ΜL (ref 0–0.1)
BASOPHILS NFR BLD AUTO: 1 % (ref 0–1)
BILIRUB SERPL-MCNC: 1.39 MG/DL
BLD SMEAR INTERP: NORMAL
BUN SERPL-MCNC: 18 MG/DL (ref 5–25)
CALCIUM SERPL-MCNC: 9.2 MG/DL (ref 8.4–10.2)
CHLORIDE SERPL-SCNC: 103 MMOL/L (ref 97–108)
CO2 SERPL-SCNC: 29 MMOL/L (ref 22–30)
CREAT SERPL-MCNC: 1.07 MG/DL (ref 0.7–1.5)
CRP SERPL QL: <5 MG/L
DACRYOCYTES BLD QL SMEAR: PRESENT
DAT POLY-SP REAG RBC QL: NEGATIVE
EOSINOPHIL # BLD AUTO: 0.3 THOUSAND/ΜL (ref 0–0.4)
EOSINOPHIL NFR BLD AUTO: 8 % (ref 0–6)
ERYTHROCYTE [DISTWIDTH] IN BLOOD BY AUTOMATED COUNT: 30.5 %
ERYTHROCYTE [SEDIMENTATION RATE] IN BLOOD: 11 MM/HOUR (ref 0–14)
FERRITIN SERPL-MCNC: 68 NG/ML (ref 8–388)
GFR SERPL CREATININE-BSD FRML MDRD: 84 ML/MIN/1.73SQ M
GLUCOSE P FAST SERPL-MCNC: 71 MG/DL (ref 70–99)
HCT VFR BLD AUTO: 41.1 % (ref 41–53)
HGB BLD-MCNC: 13 G/DL (ref 13.5–17.5)
HYPERCHROMIA BLD QL SMEAR: PRESENT
IGA SERPL-MCNC: 272 MG/DL (ref 70–400)
IGG SERPL-MCNC: 1510 MG/DL (ref 700–1600)
IGM SERPL-MCNC: 167 MG/DL (ref 40–230)
IRON SATN MFR SERPL: 18 % (ref 20–50)
IRON SERPL-MCNC: 94 UG/DL (ref 65–175)
LDH SERPL-CCNC: 637 U/L (ref 313–618)
LG PLATELETS BLD QL SMEAR: PRESENT
LYMPHOCYTES # BLD AUTO: 1.3 THOUSANDS/ΜL (ref 0.5–4)
LYMPHOCYTES NFR BLD AUTO: 33 % (ref 25–45)
MAGNESIUM SERPL-MCNC: 2 MG/DL (ref 1.6–2.3)
MCH RBC QN AUTO: 21 PG (ref 26–34)
MCHC RBC AUTO-ENTMCNC: 31.6 G/DL (ref 31–36)
MCV RBC AUTO: 67 FL (ref 80–100)
MICROCYTES BLD QL AUTO: PRESENT
MONOCYTES # BLD AUTO: 0.5 THOUSAND/ΜL (ref 0.2–0.9)
MONOCYTES NFR BLD AUTO: 12 % (ref 1–10)
NEUTROPHILS # BLD AUTO: 1.9 THOUSANDS/ΜL (ref 1.8–7.8)
NEUTS SEG NFR BLD AUTO: 47 % (ref 45–65)
OVALOCYTES BLD QL SMEAR: PRESENT
PLATELET # BLD AUTO: 112 THOUSANDS/UL (ref 150–450)
PLATELET BLD QL SMEAR: ABNORMAL
PMV BLD AUTO: 11 FL (ref 8.9–12.7)
POIKILOCYTOSIS BLD QL SMEAR: PRESENT
POTASSIUM SERPL-SCNC: 4.1 MMOL/L (ref 3.6–5)
PROT SERPL-MCNC: 8.4 G/DL (ref 5.9–8.4)
RBC # BLD AUTO: 6.18 MILLION/UL (ref 4.5–5.9)
RBC MORPH BLD: ABNORMAL
RETICS # CALC: 0.62 % (ref 0.87–2.63)
SCHISTOCYTES BLD QL SMEAR: PRESENT
SODIUM SERPL-SCNC: 138 MMOL/L (ref 137–147)
TIBC SERPL-MCNC: 516 UG/DL (ref 250–450)
VIT B12 SERPL-MCNC: 590 PG/ML (ref 100–900)
WBC # BLD AUTO: 4 THOUSAND/UL (ref 4.5–11)

## 2022-01-20 PROCEDURE — 82668 ASSAY OF ERYTHROPOIETIN: CPT

## 2022-01-20 PROCEDURE — 86880 COOMBS TEST DIRECT: CPT

## 2022-01-20 PROCEDURE — 85025 COMPLETE CBC W/AUTO DIFF WBC: CPT

## 2022-01-20 PROCEDURE — 85652 RBC SED RATE AUTOMATED: CPT

## 2022-01-20 PROCEDURE — 82607 VITAMIN B-12: CPT

## 2022-01-20 PROCEDURE — 82784 ASSAY IGA/IGD/IGG/IGM EACH: CPT

## 2022-01-20 PROCEDURE — 83735 ASSAY OF MAGNESIUM: CPT

## 2022-01-20 PROCEDURE — 85045 AUTOMATED RETICULOCYTE COUNT: CPT

## 2022-01-20 PROCEDURE — 80053 COMPREHEN METABOLIC PANEL: CPT

## 2022-01-20 PROCEDURE — 83550 IRON BINDING TEST: CPT

## 2022-01-20 PROCEDURE — 36415 COLL VENOUS BLD VENIPUNCTURE: CPT

## 2022-01-20 PROCEDURE — 83615 LACTATE (LD) (LDH) ENZYME: CPT

## 2022-01-20 PROCEDURE — 83540 ASSAY OF IRON: CPT

## 2022-01-20 PROCEDURE — 82728 ASSAY OF FERRITIN: CPT

## 2022-01-20 PROCEDURE — 86140 C-REACTIVE PROTEIN: CPT

## 2022-01-21 ENCOUNTER — OFFICE VISIT (OUTPATIENT)
Dept: HEMATOLOGY ONCOLOGY | Facility: CLINIC | Age: 42
End: 2022-01-21
Payer: COMMERCIAL

## 2022-01-21 VITALS
BODY MASS INDEX: 28.83 KG/M2 | OXYGEN SATURATION: 98 % | WEIGHT: 178.6 LBS | HEART RATE: 64 BPM | RESPIRATION RATE: 18 BRPM | DIASTOLIC BLOOD PRESSURE: 58 MMHG | SYSTOLIC BLOOD PRESSURE: 100 MMHG | TEMPERATURE: 97.9 F

## 2022-01-21 DIAGNOSIS — D69.6 THROMBOCYTOPENIA (HCC): ICD-10-CM

## 2022-01-21 DIAGNOSIS — R71.8 MICROCYTOSIS: ICD-10-CM

## 2022-01-21 DIAGNOSIS — K62.5 RECTAL BLEEDING: ICD-10-CM

## 2022-01-21 DIAGNOSIS — D72.818 OTHER DECREASED WHITE BLOOD CELL (WBC) COUNT: ICD-10-CM

## 2022-01-21 DIAGNOSIS — D50.0 IRON DEFICIENCY ANEMIA DUE TO CHRONIC BLOOD LOSS: Primary | ICD-10-CM

## 2022-01-21 LAB — EPO SERPL-ACNC: 5.9 MIU/ML (ref 2.6–18.5)

## 2022-01-21 PROCEDURE — 99214 OFFICE O/P EST MOD 30 MIN: CPT | Performed by: NURSE PRACTITIONER

## 2022-01-21 NOTE — PROGRESS NOTES
Hematology/Oncology Outpatient Follow-up  Ruchi Padgett 35 y o  male 1980 [id number]    Date:  1/21/2022      Assessment and Plan:  1  Iron deficiency anemia due to chronic blood loss  Patient has significant improvement of his anemia hemoglobin 13 after completing 5 sessions of IV Venofer  His iron saturation continues to be slightly lower than average with elevated TIBC would likely benefit from 2 additional doses of IV Venofer 200 mg to complete correction  He continues to have significant microcytosis with elevated RBCs question hemoglobinopathy  He denies any family history of hemoglobinopathy/ anemia to his knowledge  Etiology of patient's iron deficiency is likely due to his chronic hemorrhoidal bleeding which he states has stopped at this point  Has not seen colorectal surgery for evaluation yet we will refer him and schedule appointment today  Request he follow-up again with repeat labs/ additional studies in 2 months  Will also check hemoglobin fractionation cascade to evaluate for hemoglobinopathy     - CBC and differential; Future  - Comprehensive metabolic panel; Future  - C-reactive protein; Future  - Sedimentation rate, automated; Future  - Vitamin B12; Future  - Iron Panel (Includes Ferritin, Iron Sat%, Iron, and TIBC); Future  - Ferritin; Future  - Haptoglobin; Future  - LD,Blood; Future  - Reticulocytes; Future  - Copper Level; Future  - Folate; Future  - H  pylori antigen, stool; Future  - Protein electrophoresis, serum; Future  - Immunoglobulin free LT chains blood; Future  - IgG, IgA, IgM; Future  - Hgb Fractionation Ezel; Future  - Ambulatory referral to Colorectal Surgery; Future    2  Thrombocytopenia ([facility] [address] )  Patient continues to have occasional thrombocytopenia his most recent CBC showed platelet count 583  Initially was felt to be due to consumption with his rectal bleeding however is no longer bleeding   Initial workup did not reveal any findings to explain his occasional thrombocytopenia  He does admit to consuming alcohol on a daily basis which may or may not be contributory; this may also be the reason for his decreased reticulocyte count  Encouraged him to slow down or stop consuming alcohol altogether  Additional workup to rule out other etiologies of his thrombocytopenia will be pursued before his next office visit in 2 months     - CBC and differential; Future  - Comprehensive metabolic panel; Future  - C-reactive protein; Future  - Sedimentation rate, automated; Future  - Vitamin B12; Future  - Iron Panel (Includes Ferritin, Iron Sat%, Iron, and TIBC); Future  - Ferritin; Future  - Haptoglobin; Future  - LD,Blood; Future  - Reticulocytes; Future  - Copper Level; Future  - Folate; Future  - H  pylori antigen, stool; Future  - HIV 1/2 Antigen/Antibody (4th Generation) w Reflex [facility]; Future  - Chronic Hepatitis Panel; Future  - Protein electrophoresis, serum; Future  - Immunoglobulin free LT chains blood; Future  - IgG, IgA, IgM; Future  - APTT; Future  - Protime-INR; Future  - Fibrinogen; Future  - Ambulatory referral to Colorectal Surgery; Future    3  Microcytosis  As above #1     4  Other decreased white blood cell (WBC) count  Patient was noted to have occasional mild leukopenia with normal white cell differential  As above #2  5  Rectal bleeding  - CBC and differential; Future  - Iron Panel (Includes Ferritin, Iron Sat%, Iron, and TIBC); Future  - Ferritin; Future  - Ambulatory referral to Colorectal Surgery; Future      HPI:  This is a 49-year-old male originally from [place] without significant past medical history  He stated that he has chronic rectal bleeding for some time due to hemorrhoids  He was admitted to the hospital on 10/06/2021 with severe anemia and hemoglobin level around 5 9  He had a colonoscopy on 10/06/2021 which showed large internal hemorrhoids with the normal remaining colon   Patient was transfused with 2 units of packed red blood cells and was started on oral iron supplements  His most blood work from 10/07/2021 showed hemoglobin of 8 3 with normal white cells  He was also found to have low platelet count of a [id number]  He did have a borderline low platelet back in January 2020  His MCV was 64  The iron panel showed severe iron deficiency with ferritin of 3 and saturation of 3%  He was treated with a course of IV iron Venofer x5 treatments November/December 2021  Interval history:  Patient presents today for a follow-up visit accompanied by his wife  He denies any bleeding from any site and states that he is no longer experiencing the rectal bleeding from his hemorrhoids  Was treated with a course of IV iron Venofer x5 treatments November/December  Has no complaints today  Admits to drinking alcohol and smoking marijuana on a daily basis  His most recent laboratory studies from 01/20/2021 showed slightly decreased WBC 4 0 with normal white cell differential and no obvious hint of immature cells,  Peripheral smear did note schistocytes and tear drop cells his anemia has improved significantly H&H 13 0/41 1 continues to have microcytosis MCV 67 with elevated RBC 6 18 his platelet count continues to be decreased 112,000  Total bili is slightly above average 1 39 otherwise normal CMP  Inflammatory markers are not elevated  His reticulocyte count is decreased 0 62  B12 is appropriate iron panel has improved but still showing lower than average iron saturation 18%, TIBC increased 516, serum iron 94 with ferritin 68  LDH is slightly elevated 637  Direct Lanny negative  Immunoglobulins are normal  Erythropoietin 5 9     ROS: Review of Systems   Constitutional: Negative for activity change, appetite change, chills, fatigue, fever and unexpected weight change  HENT: Negative for congestion, mouth sores, nosebleeds, sore throat and trouble swallowing  Eyes: Negative  Respiratory: Negative for cough, chest tightness and shortness of breath  Cardiovascular: Negative for chest pain, palpitations and leg swelling  Gastrointestinal: Negative for abdominal distention, abdominal pain, blood in stool, constipation, diarrhea, nausea and vomiting  Genitourinary: Negative for difficulty urinating, dysuria, frequency, hematuria and urgency  Musculoskeletal: Negative for arthralgias, back pain, gait problem, joint swelling and myalgias  Skin: Negative for color change, pallor and rash  Neurological: Negative for dizziness, weakness, light-headedness, numbness and headaches  Hematological: Negative for adenopathy  Does not bruise/bleed easily  Psychiatric/Behavioral: Negative for dysphoric mood and sleep disturbance  The patient is not nervous/anxious  No past medical history on file  No past surgical history on file      Social History     Socioeconomic History    Marital status: /Civil Union     Spouse name: None    Number of children: None    Years of education: None    Highest education level: None   Occupational History    None   Tobacco Use    Smoking status: Former Smoker     Types: Cigars    Smokeless tobacco: Never Used    Tobacco comment: Cigar once a week   Vaping Use    Vaping Use: Never used   Substance and Sexual Activity    Alcohol use: Yes     Comment: Occasional    Drug use: Yes     Types: Marijuana     Comment: every day    Sexual activity: Yes   Other Topics Concern    None   Social History Narrative    None     Social Determinants of Health     Financial Resource Strain: Not on file   Food Insecurity: Not on file   Transportation Needs: Not on file   Physical Activity: Not on file   Stress: Not on file   Social Connections: Not on file   Intimate Partner Violence: Not on file   Housing Stability: Not on file       Family History   Problem Relation Age of Onset    No Known Problems Mother     No Known Problems Father        No Known Allergies      Current Outpatient Medications:     ferrous sulfate 324 (65 Fe) mg, Take 1 tablet (324 mg total) by mouth every other day (Patient not taking: Reported on 1/21/2022 ), Disp: 30 tablet, Rfl: 5    hydrocortisone (ANUSOL-HC) 25 mg suppository, Insert 1 suppository (25 mg total) into the rectum 2 (two) times a day (Patient not taking: Reported on 1/21/2022 ), Disp: 20 suppository, Rfl: 0      Physical Exam:  /58 (BP Location: Left arm, Patient Position: Sitting, Cuff Size: Large)   Pulse 64   Temp 97 9 °F (36 6 °C) (Tympanic)   Resp 18   Wt 81 kg (178 lb 9 6 oz)   SpO2 98%   BMI 28 83 kg/m²     Physical Exam  Constitutional:       General: He is not in acute distress  Appearance: He is well-developed  He is not diaphoretic  Comments: Noted strong smell of marijuana in room   HENT:      Head: Normocephalic and atraumatic  Nose: Nose normal    Eyes:      General: No scleral icterus  Conjunctiva/sclera:      Right eye: Right conjunctiva is injected  Left eye: Left conjunctiva is injected  Pulmonary:      Effort: Pulmonary effort is normal  No respiratory distress  Musculoskeletal:         General: Normal range of motion  Cervical back: Normal range of motion and neck supple  Skin:     Coloration: Skin is not pale  Findings: No erythema or rash  Neurological:      Mental Status: He is alert and oriented to person, place, and time  Psychiatric:         Behavior: Behavior normal          Thought Content:  Thought content normal          Judgment: Judgment normal            Labs:  Lab Results   Component Value Date    WBC 4 00 (L) 01/20/2022    HGB 13 0 (L) 01/20/2022    HCT 41 1 01/20/2022    MCV 67 (L) 01/20/2022     (L) 01/20/2022     Lab Results   Component Value Date    K 4 1 01/20/2022     01/20/2022    CO2 29 01/20/2022    BUN 18 01/20/2022    CREATININE 1 07 01/20/2022    GLUF 71 01/20/2022    CALCIUM 9 2 01/20/2022    AST 57 01/20/2022    ALT 38 01/20/2022    ALKPHOS 50 01/20/2022    EGFR 84 01/20/2022 Patient voiced understanding and agreement in the above discussion  Aware to contact our office with questions/symptoms in the interim  This note has been generated by voice recognition software system  Therefore, there may be spelling, grammar, and or syntax errors  Please contact if questions arise

## 2022-01-26 RX ORDER — SODIUM CHLORIDE 9 MG/ML
20 INJECTION, SOLUTION INTRAVENOUS ONCE
Status: CANCELLED | OUTPATIENT
Start: 2022-02-04

## 2022-02-03 ENCOUNTER — TELEPHONE (OUTPATIENT)
Dept: HEMATOLOGY ONCOLOGY | Facility: CLINIC | Age: 42
End: 2022-02-03

## 2022-02-03 NOTE — TELEPHONE ENCOUNTER
Called pt to review insurance RTE inactive -- s/w Myrna Smith wife - she said the problem is the processing of the premium payment  She has been in contact w/ [facility] sorting it out  She verbalized understing of the possibility of the patient being responsible for Infusion  She states she is going to contact [facility] again and provide an update when available

## 2022-02-04 ENCOUNTER — HOSPITAL ENCOUNTER (OUTPATIENT)
Dept: INFUSION CENTER | Facility: HOSPITAL | Age: 42
Discharge: HOME | End: 2022-02-04
Attending: INTERNAL MEDICINE
Payer: COMMERCIAL

## 2022-02-04 VITALS
DIASTOLIC BLOOD PRESSURE: 62 MMHG | SYSTOLIC BLOOD PRESSURE: 138 MMHG | RESPIRATION RATE: 16 BRPM | TEMPERATURE: 97.6 F | HEART RATE: 60 BPM

## 2022-02-04 DIAGNOSIS — D50.0 IRON DEFICIENCY ANEMIA DUE TO CHRONIC BLOOD LOSS: Primary | ICD-10-CM

## 2022-02-04 PROCEDURE — 96365 THER/PROPH/DIAG IV INF INIT: CPT

## 2022-02-04 RX ORDER — SODIUM CHLORIDE 9 MG/ML
20 INJECTION, SOLUTION INTRAVENOUS ONCE
Status: CANCELLED | OUTPATIENT
Start: 2022-02-11

## 2022-02-04 RX ORDER — SODIUM CHLORIDE 9 MG/ML
20 INJECTION, SOLUTION INTRAVENOUS ONCE
Status: COMPLETED | OUTPATIENT
Start: 2022-02-04 | End: 2022-02-04

## 2022-02-04 RX ADMIN — IRON SUCROSE 200 MG: 20 INJECTION, SOLUTION INTRAVENOUS at 12:22

## 2022-02-04 RX ADMIN — SODIUM CHLORIDE 20 ML/HR: 0.9 INJECTION, SOLUTION INTRAVENOUS at 12:22

## 2022-02-11 ENCOUNTER — HOSPITAL ENCOUNTER (OUTPATIENT)
Dept: INFUSION CENTER | Facility: HOSPITAL | Age: 42
Discharge: HOME | End: 2022-02-11
Attending: INTERNAL MEDICINE
Payer: COMMERCIAL

## 2022-02-11 VITALS
OXYGEN SATURATION: 98 % | SYSTOLIC BLOOD PRESSURE: 133 MMHG | DIASTOLIC BLOOD PRESSURE: 64 MMHG | TEMPERATURE: 97 F | RESPIRATION RATE: 18 BRPM | HEART RATE: 63 BPM

## 2022-02-11 DIAGNOSIS — D50.0 IRON DEFICIENCY ANEMIA DUE TO CHRONIC BLOOD LOSS: Primary | ICD-10-CM

## 2022-02-11 PROCEDURE — 96365 THER/PROPH/DIAG IV INF INIT: CPT

## 2022-02-11 RX ORDER — SODIUM CHLORIDE 9 MG/ML
20 INJECTION, SOLUTION INTRAVENOUS ONCE
Status: COMPLETED | OUTPATIENT
Start: 2022-02-11 | End: 2022-02-11

## 2022-02-11 RX ORDER — SODIUM CHLORIDE 9 MG/ML
20 INJECTION, SOLUTION INTRAVENOUS ONCE
Status: CANCELLED | OUTPATIENT
Start: 2022-02-11

## 2022-02-11 RX ADMIN — IRON SUCROSE 200 MG: 20 INJECTION, SOLUTION INTRAVENOUS at 12:33

## 2022-02-11 RX ADMIN — SODIUM CHLORIDE 20 ML/HR: 0.9 INJECTION, SOLUTION INTRAVENOUS at 12:30

## 2022-02-11 NOTE — PROGRESS NOTES
Patient tolerated IV infusion without difficulty  Work note provided, AVS given  Discussed follow-up appt

## 2022-02-19 ENCOUNTER — LAB (OUTPATIENT)
Dept: LAB | Facility: HOSPITAL | Age: 42
End: 2022-02-19
Attending: INTERNAL MEDICINE
Payer: COMMERCIAL

## 2022-02-19 DIAGNOSIS — D69.6 THROMBOCYTOPENIA (HCC): ICD-10-CM

## 2022-02-19 DIAGNOSIS — D50.0 IRON DEFICIENCY ANEMIA DUE TO CHRONIC BLOOD LOSS: ICD-10-CM

## 2022-02-19 DIAGNOSIS — K62.5 RECTAL BLEEDING: ICD-10-CM

## 2022-02-19 LAB
ALBUMIN SERPL BCP-MCNC: 4.3 G/DL (ref 3–5.2)
ALP SERPL-CCNC: 53 U/L (ref 43–122)
ALT SERPL W P-5'-P-CCNC: 35 U/L
ANION GAP SERPL CALCULATED.3IONS-SCNC: 4 MMOL/L (ref 5–14)
ANISOCYTOSIS BLD QL SMEAR: PRESENT
APTT PPP: 31 SECONDS (ref 23–37)
AST SERPL W P-5'-P-CCNC: 42 U/L (ref 17–59)
BASOPHILS # BLD AUTO: 0 THOUSANDS/ΜL (ref 0–0.1)
BASOPHILS NFR BLD AUTO: 0 % (ref 0–1)
BILIRUB SERPL-MCNC: 0.68 MG/DL
BUN SERPL-MCNC: 16 MG/DL (ref 5–25)
CALCIUM SERPL-MCNC: 9.3 MG/DL (ref 8.4–10.2)
CHLORIDE SERPL-SCNC: 105 MMOL/L (ref 97–108)
CO2 SERPL-SCNC: 29 MMOL/L (ref 22–30)
CREAT SERPL-MCNC: 1.14 MG/DL (ref 0.7–1.5)
CRP SERPL QL: <5 MG/L
DACRYOCYTES BLD QL SMEAR: PRESENT
EOSINOPHIL # BLD AUTO: 0.3 THOUSAND/ΜL (ref 0–0.4)
EOSINOPHIL NFR BLD AUTO: 6 % (ref 0–6)
ERYTHROCYTE [DISTWIDTH] IN BLOOD BY AUTOMATED COUNT: 25 %
ERYTHROCYTE [SEDIMENTATION RATE] IN BLOOD: 7 MM/HOUR (ref 0–14)
FERRITIN SERPL-MCNC: 149 NG/ML (ref 8–388)
FIBRINOGEN PPP-MCNC: 202 MG/DL (ref 227–495)
FOLATE SERPL-MCNC: 13.1 NG/ML (ref 3.1–17.5)
GFR SERPL CREATININE-BSD FRML MDRD: 78 ML/MIN/1.73SQ M
GLUCOSE SERPL-MCNC: 100 MG/DL (ref 70–99)
HCT VFR BLD AUTO: 39.7 % (ref 41–53)
HGB BLD-MCNC: 13 G/DL (ref 13.5–17.5)
HYPERCHROMIA BLD QL SMEAR: PRESENT
IGA SERPL-MCNC: 247 MG/DL (ref 70–400)
IGG SERPL-MCNC: 1420 MG/DL (ref 700–1600)
IGM SERPL-MCNC: 157 MG/DL (ref 40–230)
INR PPP: 1.06 (ref 0.84–1.19)
IRON SATN MFR SERPL: 18 % (ref 20–50)
IRON SERPL-MCNC: 70 UG/DL (ref 65–175)
LDH SERPL-CCNC: 540 U/L (ref 313–618)
LG PLATELETS BLD QL SMEAR: PRESENT
LYMPHOCYTES # BLD AUTO: 1.2 THOUSANDS/ΜL (ref 0.5–4)
LYMPHOCYTES NFR BLD AUTO: 29 % (ref 25–45)
MCH RBC QN AUTO: 22.4 PG (ref 26–34)
MCHC RBC AUTO-ENTMCNC: 32.8 G/DL (ref 31–36)
MCV RBC AUTO: 68 FL (ref 80–100)
MICROCYTES BLD QL AUTO: PRESENT
MONOCYTES # BLD AUTO: 0.5 THOUSAND/ΜL (ref 0.2–0.9)
MONOCYTES NFR BLD AUTO: 11 % (ref 1–10)
NEUTROPHILS # BLD AUTO: 2.3 THOUSANDS/ΜL (ref 1.8–7.8)
NEUTS SEG NFR BLD AUTO: 54 % (ref 45–65)
OVALOCYTES BLD QL SMEAR: PRESENT
PLATELET # BLD AUTO: 101 THOUSANDS/UL (ref 150–450)
PLATELET BLD QL SMEAR: ABNORMAL
PMV BLD AUTO: 10.1 FL (ref 8.9–12.7)
POIKILOCYTOSIS BLD QL SMEAR: PRESENT
POLYCHROMASIA BLD QL SMEAR: PRESENT
POTASSIUM SERPL-SCNC: 4.1 MMOL/L (ref 3.6–5)
PROT SERPL-MCNC: 7.9 G/DL (ref 5.9–8.4)
PROTHROMBIN TIME: 13.4 SECONDS (ref 11.6–14.5)
RBC # BLD AUTO: 5.8 MILLION/UL (ref 4.5–5.9)
RBC MORPH BLD: PRESENT
RETICS # CALC: 1.02 % (ref 0.87–2.63)
SCHISTOCYTES BLD QL SMEAR: PRESENT
SODIUM SERPL-SCNC: 138 MMOL/L (ref 137–147)
TIBC SERPL-MCNC: 391 UG/DL (ref 250–450)
VIT B12 SERPL-MCNC: 495 PG/ML (ref 100–900)
WBC # BLD AUTO: 4.2 THOUSAND/UL (ref 4.5–11)

## 2022-02-19 PROCEDURE — 85384 FIBRINOGEN ACTIVITY: CPT

## 2022-02-19 PROCEDURE — 86803 HEPATITIS C AB TEST: CPT

## 2022-02-19 PROCEDURE — 84165 PROTEIN E-PHORESIS SERUM: CPT | Performed by: PATHOLOGY

## 2022-02-19 PROCEDURE — 83540 ASSAY OF IRON: CPT

## 2022-02-19 PROCEDURE — 86140 C-REACTIVE PROTEIN: CPT

## 2022-02-19 PROCEDURE — 85610 PROTHROMBIN TIME: CPT

## 2022-02-19 PROCEDURE — 87389 HIV-1 AG W/HIV-1&-2 AB AG IA: CPT

## 2022-02-19 PROCEDURE — 82728 ASSAY OF FERRITIN: CPT

## 2022-02-19 PROCEDURE — 80053 COMPREHEN METABOLIC PANEL: CPT

## 2022-02-19 PROCEDURE — 85730 THROMBOPLASTIN TIME PARTIAL: CPT

## 2022-02-19 PROCEDURE — 82746 ASSAY OF FOLIC ACID SERUM: CPT

## 2022-02-19 PROCEDURE — 85045 AUTOMATED RETICULOCYTE COUNT: CPT

## 2022-02-19 PROCEDURE — 36415 COLL VENOUS BLD VENIPUNCTURE: CPT

## 2022-02-19 PROCEDURE — 83615 LACTATE (LD) (LDH) ENZYME: CPT

## 2022-02-19 PROCEDURE — 85025 COMPLETE CBC W/AUTO DIFF WBC: CPT

## 2022-02-19 PROCEDURE — 83020 HEMOGLOBIN ELECTROPHORESIS: CPT

## 2022-02-19 PROCEDURE — 82607 VITAMIN B-12: CPT

## 2022-02-19 PROCEDURE — 82784 ASSAY IGA/IGD/IGG/IGM EACH: CPT

## 2022-02-19 PROCEDURE — 83010 ASSAY OF HAPTOGLOBIN QUANT: CPT

## 2022-02-19 PROCEDURE — 87340 HEPATITIS B SURFACE AG IA: CPT

## 2022-02-19 PROCEDURE — 83521 IG LIGHT CHAINS FREE EACH: CPT

## 2022-02-19 PROCEDURE — 84165 PROTEIN E-PHORESIS SERUM: CPT

## 2022-02-19 PROCEDURE — 86705 HEP B CORE ANTIBODY IGM: CPT

## 2022-02-19 PROCEDURE — 85652 RBC SED RATE AUTOMATED: CPT

## 2022-02-19 PROCEDURE — 82525 ASSAY OF COPPER: CPT

## 2022-02-19 PROCEDURE — 86704 HEP B CORE ANTIBODY TOTAL: CPT

## 2022-02-19 PROCEDURE — 83550 IRON BINDING TEST: CPT

## 2022-02-20 LAB
HAPTOGLOB SERPL-MCNC: 53 MG/DL (ref 23–355)
HBV CORE AB SER QL: REACTIVE
HBV CORE IGM SER QL: ABNORMAL
HBV SURFACE AG SER QL: ABNORMAL
HCV AB SER QL: ABNORMAL

## 2022-02-21 LAB — HIV 1+2 AB+HIV1 P24 AG SERPL QL IA: NORMAL

## 2022-02-22 LAB
KAPPA LC FREE SER-MCNC: 14.4 MG/L (ref 3.3–19.4)
KAPPA LC FREE/LAMBDA FREE SER: 0.92 {RATIO} (ref 0.26–1.65)
LAMBDA LC FREE SERPL-MCNC: 15.7 MG/L (ref 5.7–26.3)

## 2022-02-23 LAB
ALBUMIN SERPL ELPH-MCNC: 4.46 G/DL (ref 3.5–5)
ALBUMIN SERPL ELPH-MCNC: 59.5 % (ref 52–65)
ALPHA1 GLOB SERPL ELPH-MCNC: 0.24 G/DL (ref 0.1–0.4)
ALPHA1 GLOB SERPL ELPH-MCNC: 3.2 % (ref 2.5–5)
ALPHA2 GLOB SERPL ELPH-MCNC: 0.56 G/DL (ref 0.4–1.2)
ALPHA2 GLOB SERPL ELPH-MCNC: 7.5 % (ref 7–13)
BETA GLOB ABNORMAL SERPL ELPH-MCNC: 0.44 G/DL (ref 0.4–0.8)
BETA1 GLOB SERPL ELPH-MCNC: 5.9 % (ref 5–13)
BETA2 GLOB SERPL ELPH-MCNC: 4.6 % (ref 2–8)
BETA2+GAMMA GLOB SERPL ELPH-MCNC: 0.35 G/DL (ref 0.2–0.5)
GAMMA GLOB ABNORMAL SERPL ELPH-MCNC: 1.45 G/DL (ref 0.5–1.6)
GAMMA GLOB SERPL ELPH-MCNC: 19.3 % (ref 12–22)
HGB A MFR BLD: 2.2 % (ref 1.8–3.2)
HGB A MFR BLD: 97.8 % (ref 96.4–98.8)
HGB F MFR BLD: 0 % (ref 0–2)
HGB FRACT BLD-IMP: NORMAL
HGB S MFR BLD: 0 %
IGG/ALB SER: 1.47 {RATIO} (ref 1.1–1.8)
PROT PATTERN SERPL ELPH-IMP: NORMAL
PROT SERPL-MCNC: 7.5 G/DL (ref 6.4–8.2)

## 2022-02-24 LAB — COPPER SERPL-MCNC: 110 UG/DL (ref 69–132)

## 2022-03-10 ENCOUNTER — OFFICE VISIT (OUTPATIENT)
Dept: FAMILY MEDICINE CLINIC | Facility: CLINIC | Age: 42
End: 2022-03-10
Payer: COMMERCIAL

## 2022-03-10 VITALS
HEIGHT: 68 IN | HEART RATE: 56 BPM | WEIGHT: 181 LBS | DIASTOLIC BLOOD PRESSURE: 94 MMHG | BODY MASS INDEX: 27.43 KG/M2 | RESPIRATION RATE: 20 BRPM | OXYGEN SATURATION: 98 % | TEMPERATURE: 97.6 F | SYSTOLIC BLOOD PRESSURE: 138 MMHG

## 2022-03-10 DIAGNOSIS — R20.0 NUMBNESS OF RIGHT HAND: Primary | ICD-10-CM

## 2022-03-10 DIAGNOSIS — G56.01 CARPAL TUNNEL SYNDROME OF RIGHT WRIST: ICD-10-CM

## 2022-03-10 DIAGNOSIS — Z31.41 ENCOUNTER FOR SPERM COUNT FOR FERTILITY TESTING: ICD-10-CM

## 2022-03-10 DIAGNOSIS — K64.9 BLEEDING HEMORRHOIDS: ICD-10-CM

## 2022-03-10 PROCEDURE — 99213 OFFICE O/P EST LOW 20 MIN: CPT | Performed by: FAMILY MEDICINE

## 2022-03-10 PROCEDURE — 3008F BODY MASS INDEX DOCD: CPT | Performed by: FAMILY MEDICINE

## 2022-03-10 NOTE — ASSESSMENT & PLAN NOTE
Patient states that wife has not being able wife not being able to conceive  He is interested in checking sperm count  Recommended urology evaluation

## 2022-03-10 NOTE — PROGRESS NOTES
Assessment/Plan:    Right hand numbness/ Right carpal tunnel -  recommended to wear a wrist splint for carpal tunnel  Take Ibuprofen PRN for hand joint pains  Schedule evaluation by orthopedic surgeon Dr Peter Corcoran  Bleeding hemorrhoids  Referred to colorectal surgeon for evaluation  Encounter for sperm count for fertility testing  Patient states that wife has not being able wife not being able to conceive  He is interested in checking sperm count  Recommended urology evaluation  Diagnoses and all orders for this visit:    Numbness of right hand  -     Ambulatory Referral to Orthopedic Surgery; Future    Carpal tunnel syndrome of right wrist  -     Ambulatory Referral to Orthopedic Surgery; Future    Bleeding hemorrhoids  -     Ambulatory Referral to Colorectal Surgery; Future    Encounter for sperm count for fertility testing  -     Ambulatory Referral to Urology; Future          Subjective:      Patient ID: Benjamin Luong is a 35 y o  male  HPI     Patient presents to the office c/o numbness in right hand, stiffness, pain in hand joints for the past month  Denies neck pain  No right upper arm pain or tingling  Patient states that he injured his right hand last year at work  Patient c/o bleeding hemorrhoids, would like a referral to colorectal specialist       He also would like to make an appointment with urologist to discuss checking sperm count due to his wife has not being able to conceive  Reports to testicular pain, dysuria  The following portions of the patient's history were reviewed and updated as appropriate: allergies, current medications, past family history, past medical history, past surgical history and problem list     Review of Systems   Constitutional: Negative for activity change, appetite change, chills, fatigue and fever  Respiratory: Negative for cough and shortness of breath  Cardiovascular: Negative for chest pain     Gastrointestinal: Negative for abdominal pain  Bleeding hemorrhoids   Genitourinary: Negative for difficulty urinating, dysuria, flank pain, hematuria, scrotal swelling and testicular pain  Musculoskeletal: Positive for arthralgias (pain, stiffness in hand joints)  Negative for back pain, joint swelling and neck pain  Skin: Negative for rash  Neurological: Positive for numbness (right hand)  Negative for dizziness and headaches  Hematological: Negative  Objective:      /94 (BP Location: Left arm, Patient Position: Sitting, Cuff Size: Adult)   Pulse 56   Temp 97 6 °F (36 4 °C) (Tympanic)   Resp 20   Ht 5' 7 75" (1 721 m)   Wt 82 1 kg (181 lb)   SpO2 98%   BMI 27 72 kg/m²          Physical Exam  Vitals and nursing note reviewed  Cardiovascular:      Rate and Rhythm: Regular rhythm  Heart sounds: No murmur heard  Pulmonary:      Effort: Pulmonary effort is normal       Breath sounds: Normal breath sounds  Musculoskeletal:      Cervical back: Normal range of motion and neck supple  Comments: Right hand exam: no joints swelling or tenderness  Tinel's and Phalen's test positive  Skin:     General: Skin is warm and dry  Findings: No rash     Psychiatric:         Mood and Affect: Mood normal

## 2022-03-11 ENCOUNTER — TELEPHONE (OUTPATIENT)
Dept: FAMILY MEDICINE CLINIC | Facility: CLINIC | Age: 42
End: 2022-03-11

## 2022-03-11 DIAGNOSIS — Z31.41 ENCOUNTER FOR SPERM COUNT FOR FERTILITY TESTING: Primary | ICD-10-CM

## 2022-03-11 NOTE — TELEPHONE ENCOUNTER
Patient's wife, Katharine Vega ([phone]) called to state that patient contacted [facility] to set up fertility testing  States a script is needed for fertility count  States this was discussed at yesterday's visit  Request call or voicemail to confirm with script is ordered  Will confirm how patient would like to receive order once completed

## 2022-03-17 ENCOUNTER — TELEPHONE (OUTPATIENT)
Dept: HEMATOLOGY ONCOLOGY | Facility: CLINIC | Age: 42
End: 2022-03-17

## 2022-03-17 NOTE — TELEPHONE ENCOUNTER
Appointment Cancellation Or Reschedule     Person calling in  Mckenzie-Wife   Provider Myrna Szymanski    Office Visit Date and Time 3-18-22 @ 11:30am   Office Visit Location [facility]    Did patient want to reschedule their office appointment? If so, when was it scheduled to? Yes, 5-13-22 @ 8:30am    Is this patient calling to reschedule an infusion appointment? No   When is their next infusion appointment? No   Is this patient a Chemo patient? No   Reason for Cancellation or Reschedule Schedule conflict      If the patient is a treatment patient, please route this to the office nurse  If the patient is not on treatment, please route to the office MA

## 2022-03-21 ENCOUNTER — TELEPHONE (OUTPATIENT)
Dept: FAMILY MEDICINE CLINIC | Facility: CLINIC | Age: 42
End: 2022-03-21

## 2022-03-21 DIAGNOSIS — R86.9 SEMEN ABNORMALITY: Primary | ICD-10-CM

## 2022-03-21 NOTE — TELEPHONE ENCOUNTER
----- Message from Horacio Carr MD sent at 3/21/2022 11:35 AM EDT -----  Please inform patient that semen analysis showed that semen volume was below the normal range  Lab Center recommended to repeat semen analysis in 3 weeks      Order placed in chart   ----- Message -----  From: Asia Yanes MA  Sent: 3/21/2022  10:50 AM EDT  To: Horacio Carr MD      ----- Message -----  From: Jen Ruiz  Sent: 3/21/2022  10:48 AM EDT  To: Pasha

## 2022-04-19 ENCOUNTER — OFFICE VISIT (OUTPATIENT)
Dept: OBGYN CLINIC | Facility: OTHER | Age: 42
End: 2022-04-19
Payer: COMMERCIAL

## 2022-04-19 ENCOUNTER — APPOINTMENT (OUTPATIENT)
Dept: RADIOLOGY | Facility: OTHER | Age: 42
End: 2022-04-19
Payer: COMMERCIAL

## 2022-04-19 VITALS
HEIGHT: 67 IN | WEIGHT: 174.4 LBS | SYSTOLIC BLOOD PRESSURE: 121 MMHG | HEART RATE: 71 BPM | DIASTOLIC BLOOD PRESSURE: 74 MMHG | BODY MASS INDEX: 27.37 KG/M2

## 2022-04-19 DIAGNOSIS — M79.641 RIGHT HAND PAIN: ICD-10-CM

## 2022-04-19 DIAGNOSIS — R20.0 BILATERAL HAND NUMBNESS: Primary | ICD-10-CM

## 2022-04-19 DIAGNOSIS — G56.01 CARPAL TUNNEL SYNDROME OF RIGHT WRIST: ICD-10-CM

## 2022-04-19 DIAGNOSIS — R20.0 NUMBNESS OF RIGHT HAND: ICD-10-CM

## 2022-04-19 DIAGNOSIS — R20.0 BILATERAL HAND NUMBNESS: ICD-10-CM

## 2022-04-19 PROCEDURE — 3008F BODY MASS INDEX DOCD: CPT | Performed by: ORTHOPAEDIC SURGERY

## 2022-04-19 PROCEDURE — 73110 X-RAY EXAM OF WRIST: CPT

## 2022-04-19 PROCEDURE — 99204 OFFICE O/P NEW MOD 45 MIN: CPT | Performed by: ORTHOPAEDIC SURGERY

## 2022-04-19 RX ORDER — GABAPENTIN 300 MG/1
300 CAPSULE ORAL
Qty: 30 CAPSULE | Refills: 1 | Status: SHIPPED | OUTPATIENT
Start: 2022-04-19 | End: 2022-06-29

## 2022-04-19 NOTE — PROGRESS NOTES
Assessment:       1  Bilateral hand numbness    2  Numbness of right hand    3  Carpal tunnel syndrome of right wrist    4  Right hand pain          Plan:        Explained my current clinical findings and reviewed radiological findings with Jackie Reddy  His clinical assessment is consistent with bilateral carpal tunnel syndrome which seems to be worse on the right side  At this time, I will request an EMG study of bilateral upper extremities for further assessment  In the interim, I have advised him to wear a wrist brace at night for both sides and will also place him on oral gabapentin for nighttime symptoms  We discussed that if the EMG reveals evidence of only mild carpal tunnel we could consider doing carpal tunnel cortisone injections  However, if there are any severe findings on the EMG study it may warrant surgical referral for carpal tunnel release  Subjective:     Patient ID: Pelon Forbes is a 35 y o  male  Chief Complaint:  Right hand pain and bilateral hand numbness    HPI  Patient is a 38-year-old right-hand-dominant gentleman who presents today for evaluation of bilateral hand numbness and some right hand pain  He has been referred in this regard by his primary care physician Dr Noelle Fleming MD     1  Right hand pain - patient attributes this to an injury sustained at work in December 2022 when he had a direct impact on the dorsal aspect of his right hand by a "roller"  Currently, his main concern is right hand pain which affected his right thumb, index and middle fingers and is associated with nighttime symptoms, positive "flick" sign, numbness of the affected digits, occasional cramping sensation of his right hand and difficulty opening jars  He has worn a nighttime wrist brace with minimal improvement so far  2  Left hand numbness - symptoms are similar to the right hand but to a much lesser extent    Denies any significant pain of the left hand but does report numbness of his left thumb, index and middle fingers  Denies any weakness of his left hand  Patient denies any associated neck pain  Social History     Occupational History    Not on file   Tobacco Use    Smoking status: Former Smoker     Types: Cigars    Smokeless tobacco: Never Used    Tobacco comment: Cigar once a week   Vaping Use    Vaping Use: Never used   Substance and Sexual Activity    Alcohol use: Yes     Comment: Occasional    Drug use: Yes     Types: Marijuana     Comment: every day    Sexual activity: Yes      Review of Systems        Objective:     Ortho ExamPhysical Exam  Vitals and nursing note reviewed  Constitutional:       Appearance: He is well-developed  HENT:      Head: Normocephalic and atraumatic  Eyes:      Conjunctiva/sclera: Conjunctivae normal    Cardiovascular:      Rate and Rhythm: Normal rate  Pulmonary:      Effort: Pulmonary effort is normal  No respiratory distress  Skin:     General: Skin is warm  Findings: No erythema  Neurological:      Mental Status: He is alert and oriented to person, place, and time  Psychiatric:         Behavior: Behavior normal          Thought Content: Thought content normal          Judgment: Judgment normal          Right wrist exam:    No muscle atrophy  There is paresthesia to light touch in the right median nerve distribution  Grade 5/5 strength of right thumb opposition and abduction  Positive carpal tunnel compression test   Positive Phalen sign  Positive Tinel's at the carpal tunnel  Good right hand  strength  Left wrist exam:  No muscle atrophy  There is mild paresthesia to light touch in the left median nerve distribution  Grade 5/5 strength of left thumb opposition and abduction  Positive carpal tunnel compression test   Positive Phalen sign  Negative Tinel's at the carpal tunnel  Good left hand  strength          I have personally reviewed pertinent films in PACS and my interpretation is As noted below:  Plain radiograph of the right wrist performed today does not reveal any acute osseous injury or degenerative changes  Plain radiograph of the left wrist performed today reveals no acute osseous injury or degenerative changes  Chronic appearing ulnar styloid fracture is noted

## 2022-04-19 NOTE — PROGRESS NOTES
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    GERD (gastroesophageal reflux disease)    Rib pain on left side    Dizziness    Dyspnea on exertion    Fatigue    Heart murmur    Rectal bleeding    Left upper quadrant abdominal pain    Acute blood loss anemia    Chronic low back pain    Internal hemorrhoids    Thrombocytopenia (HCC)    Iron deficiency    Iron deficiency anemia due to chronic blood loss    Elevated LFTs    Numbness of right hand    Carpal tunnel syndrome of right wrist    Bleeding hemorrhoids        Current Outpatient Medications on File Prior to Visit   Medication Sig Dispense Refill    ferrous sulfate 324 (65 Fe) mg Take 1 tablet (324 mg total) by mouth every other day (Patient not taking: Reported on 1/21/2022 ) 30 tablet 5    hydrocortisone (ANUSOL-HC) 25 mg suppository Insert 1 suppository (25 mg total) into the rectum 2 (two) times a day (Patient not taking: Reported on 1/21/2022 ) 20 suppository 0     No current facility-administered medications on file prior to visit          No Known Allergies     Tobacco Use: Medium Risk    Smoking Tobacco Use: Former Smoker    Smokeless Tobacco Use: Never Used        Social Determinants of Health     Tobacco Use: Medium Risk    Smoking Tobacco Use: Former Smoker    Smokeless Tobacco Use: Never Used   Alcohol Use: Not on file   Financial Resource Strain: Not on file   Food Insecurity: Not on file   Transportation Needs: Not on file Physical Activity: Not on file   Stress: Not on file   Social Connections: Not on file   Intimate Partner Violence: Not on file   Depression: Not at risk    PHQ-2 Score: 0   Housing Stability: Not on file               Review of Systems     Body mass index is 27 31 kg/m²  Physical Exam     Ortho Exam:    Body part: {right/left/bilateral:95217} ***    Inspection: ***    Palpation: ***    Range of motion: ***    Special Tests: ***    Distal Neurovascular Status: Intact, {Yes/No with explanation/N/A:93187}    Procedures       Assessment:     Diagnosis ICD-10-CM Associated Orders   1  Numbness of right hand  R20 0 Ambulatory Referral to Orthopedic Surgery   2  Carpal tunnel syndrome of right wrist  G56 01 Ambulatory Referral to Orthopedic Surgery        Plan:    ***    {VB BMI Counselin}    Follow-Up:    ***        Portions of the record may have been created with voice recognition software  Occasional wrong word or "sound alike" substitutions may have occurred due to the inherent limitations of voice recognition software  Please review the chart carefully and recognize, using context, where substitutions/typographical errors may have occurred

## 2022-04-27 DIAGNOSIS — D50.0 IRON DEFICIENCY ANEMIA DUE TO CHRONIC BLOOD LOSS: Primary | ICD-10-CM

## 2022-04-27 DIAGNOSIS — D72.818 OTHER DECREASED WHITE BLOOD CELL (WBC) COUNT: ICD-10-CM

## 2022-04-27 DIAGNOSIS — R76.8 ABNORMAL HEPATITIS SEROLOGY: ICD-10-CM

## 2022-04-27 DIAGNOSIS — R71.8 MICROCYTOSIS: ICD-10-CM

## 2022-04-27 DIAGNOSIS — D69.6 THROMBOCYTOPENIA (HCC): ICD-10-CM

## 2022-05-03 ENCOUNTER — TELEPHONE (OUTPATIENT)
Dept: FAMILY MEDICINE CLINIC | Facility: CLINIC | Age: 42
End: 2022-05-03

## 2022-05-03 NOTE — TELEPHONE ENCOUNTER
Wife requesting semen anaysis from 3/16 and 4/6 be faxed to [facility] Dr Amarjit Andrade urologist  At [phone]

## 2022-05-06 ENCOUNTER — HOSPITAL ENCOUNTER (OUTPATIENT)
Dept: NEUROLOGY | Facility: CLINIC | Age: 42
Discharge: HOME | End: 2022-05-06
Payer: COMMERCIAL

## 2022-05-06 DIAGNOSIS — R20.0 BILATERAL HAND NUMBNESS: ICD-10-CM

## 2022-05-06 PROCEDURE — 95886 MUSC TEST DONE W/N TEST COMP: CPT | Performed by: PSYCHIATRY & NEUROLOGY

## 2022-05-06 PROCEDURE — 95912 NRV CNDJ TEST 11-12 STUDIES: CPT | Performed by: PSYCHIATRY & NEUROLOGY

## 2022-05-10 ENCOUNTER — TELEPHONE (OUTPATIENT)
Dept: HEMATOLOGY ONCOLOGY | Facility: CLINIC | Age: 42
End: 2022-05-10

## 2022-05-10 NOTE — TELEPHONE ENCOUNTER
LVM to patient in regards to his lab work that needs to be completed prior to his appointment on 5/13/22 at 8:30am

## 2022-06-16 DIAGNOSIS — R20.0 BILATERAL HAND NUMBNESS: ICD-10-CM

## 2022-06-16 DIAGNOSIS — M79.641 RIGHT HAND PAIN: ICD-10-CM

## 2022-06-29 RX ORDER — GABAPENTIN 300 MG/1
CAPSULE ORAL
Qty: 30 CAPSULE | Refills: 1 | Status: SHIPPED | OUTPATIENT
Start: 2022-06-29

## 2022-07-08 ENCOUNTER — HOSPITAL ENCOUNTER (EMERGENCY)
Facility: HOSPITAL | Age: 42
Discharge: HOME | End: 2022-07-08
Attending: EMERGENCY MEDICINE
Payer: COMMERCIAL

## 2022-07-08 ENCOUNTER — APPOINTMENT (EMERGENCY)
Dept: CT IMAGING | Facility: HOSPITAL | Age: 42
End: 2022-07-08
Payer: COMMERCIAL

## 2022-07-08 VITALS
OXYGEN SATURATION: 98 % | WEIGHT: 200 LBS | DIASTOLIC BLOOD PRESSURE: 70 MMHG | HEART RATE: 80 BPM | RESPIRATION RATE: 16 BRPM | BODY MASS INDEX: 31.32 KG/M2 | SYSTOLIC BLOOD PRESSURE: 143 MMHG | TEMPERATURE: 98.6 F
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
## 2022-07-08 DIAGNOSIS — R51.9 HEADACHE: ICD-10-CM

## 2022-07-08 PROCEDURE — 70450 CT HEAD/BRAIN W/O DYE: CPT

## 2022-07-08 PROCEDURE — 96372 THER/PROPH/DIAG INJ SC/IM: CPT

## 2022-07-08 PROCEDURE — 72125 CT NECK SPINE W/O DYE: CPT

## 2022-07-08 PROCEDURE — 99284 EMERGENCY DEPT VISIT MOD MDM: CPT

## 2022-07-08 PROCEDURE — G1004 CDSM NDSC: HCPCS

## 2022-07-08 RX ORDER — NAPROXEN 500 MG/1
500 TABLET ORAL 2 TIMES DAILY WITH MEALS
Qty: 10 TABLET | Refills: 0 | Status: SHIPPED | OUTPATIENT
Start: 2022-07-08 | End: 2023-07-08

## 2022-07-08 RX ORDER — LIDOCAINE 40 MG/G
CREAM TOPICAL AS NEEDED
Qty: 30 G | Refills: 0 | Status: SHIPPED | OUTPATIENT
Start: 2022-07-08

## 2022-07-08 RX ORDER — METHOCARBAMOL 500 MG/1
500 TABLET, FILM COATED ORAL 2 TIMES DAILY
Qty: 20 TABLET | Refills: 0 | Status: SHIPPED | OUTPATIENT
Start: 2022-07-08

## 2022-07-08 RX ORDER — METHOCARBAMOL 500 MG/1
500 TABLET, FILM COATED ORAL ONCE
Status: COMPLETED | OUTPATIENT
Start: 2022-07-08 | End: 2022-07-08

## 2022-07-08 RX ORDER — KETOROLAC TROMETHAMINE 30 MG/ML
15 INJECTION, SOLUTION INTRAMUSCULAR; INTRAVENOUS ONCE
Status: COMPLETED | OUTPATIENT
Start: 2022-07-08 | End: 2022-07-08

## 2022-07-08 RX ADMIN — METHOCARBAMOL TABLETS 500 MG: 500 TABLET, COATED ORAL at 14:52

## 2022-07-08 RX ADMIN — KETOROLAC TROMETHAMINE 15 MG: 30 INJECTION, SOLUTION INTRAMUSCULAR; INTRAVENOUS at 14:51
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
## 2022-07-08 NOTE — DISCHARGE INSTRUCTIONS
Follow-up with primary care provider  Take medications as needed for symptoms  Return to ED for new or worsening symptoms as discussed  FINDINGS:     PARENCHYMA:  No intracranial mass, mass effect or midline shift  No CT signs of acute infarction  No acute parenchymal hemorrhage  VENTRICLES AND EXTRA-AXIAL SPACES:  Normal for the patient's age  VISUALIZED ORBITS AND PARANASAL SINUSES:  Unremarkable  CALVARIUM AND EXTRACRANIAL SOFT TISSUES:  Normal      IMPRESSION:     No acute intracranial abnormality  FINDINGS:     ALIGNMENT:  Mild straightening of cervical spine, possibly positional or due to muscle spasm  No subluxation  VERTEBRAL BODIES:  No fracture  Maintained vertebral body stature  Subcentimeter round sclerotic focus at the base of the left T1 transverse process (4:102 likely represents a bone island  Additional finding of asymmetric sclerosis within the left posterior mandibular body  is nonspecific but possibly related to remote infection  No regional inflammatory changes to suggest an acute process  DEGENERATIVE CHANGES:  No significant cervical degenerative changes are noted  Small right paracentral disc protrusion at C4-C5 (3:51)  Probable shallow central protrusions at C5-C6 and C6-C7  PREVERTEBRAL AND PARASPINAL SOFT TISSUES:  Unremarkable  THORACIC INLET:  Normal      IMPRESSION:     No cervical spine fracture or traumatic malalignment

## 2022-07-08 NOTE — Clinical Note
Benjamin Luong was seen and treated in our emergency department on 7/8/2022  Diagnosis:     Jazmín Headley    He may return on this date: 07/11/2022         If you have any questions or concerns, please don't hesitate to call        Treva Boyer PA-C    ______________________________           _______________          _______________  Hospital Representative                              Date                                Time
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
## 2022-07-13 ENCOUNTER — EVALUATION (OUTPATIENT)
Dept: PHYSICAL THERAPY | Facility: CLINIC | Age: 42
End: 2022-07-13
Payer: COMMERCIAL
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
## 2022-07-13 DIAGNOSIS — S06.0X0D CONCUSSION WITHOUT LOSS OF CONSCIOUSNESS, SUBSEQUENT ENCOUNTER: Primary | ICD-10-CM

## 2022-07-13 DIAGNOSIS — R51.9 HEADACHE: ICD-10-CM

## 2022-07-13 PROCEDURE — 97110 THERAPEUTIC EXERCISES: CPT | Performed by: PHYSICAL THERAPIST

## 2022-07-13 PROCEDURE — 97162 PT EVAL MOD COMPLEX 30 MIN: CPT | Performed by: PHYSICAL THERAPIST
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
## 2022-08-16 ENCOUNTER — TELEPHONE (OUTPATIENT)
Dept: HEMATOLOGY ONCOLOGY | Facility: CLINIC | Age: 42
End: 2022-08-16

## 2022-08-16 DIAGNOSIS — D62 ACUTE BLOOD LOSS ANEMIA: ICD-10-CM

## 2022-08-16 DIAGNOSIS — M79.641 RIGHT HAND PAIN: ICD-10-CM

## 2022-08-16 DIAGNOSIS — R20.0 BILATERAL HAND NUMBNESS: ICD-10-CM

## 2022-08-16 NOTE — TELEPHONE ENCOUNTER
Received a phone call from p[t's wife stating that they have moved to [place]  Pt is feeling very tired again however he has no insurance  Pt can take oral iron which is OTC and I instructed her to have pt take one tab every other day with some orange juice   Instructed wife to have pt sign a release of Medical records when he establishes with a Dr there and then records can be sent to the new

## 2022-08-17 RX ORDER — FERROUS SULFATE TAB EC 324 MG (65 MG FE EQUIVALENT) 324 (65 FE) MG
324 TABLET DELAYED RESPONSE ORAL EVERY OTHER DAY
Qty: 30 TABLET | Refills: 1 | Status: SHIPPED | OUTPATIENT
Start: 2022-08-17

## 2022-08-17 RX ORDER — GABAPENTIN 300 MG/1
300 CAPSULE ORAL
Qty: 30 CAPSULE | Refills: 1 | Status: SHIPPED | OUTPATIENT
Start: 2022-08-17

## 2023-03-13 ENCOUNTER — TELEPHONE (OUTPATIENT)
Dept: HEMATOLOGY ONCOLOGY | Facility: CLINIC | Age: 43
End: 2023-03-13

## 2023-03-13 NOTE — TELEPHONE ENCOUNTER
Hello  This call is going to be for Doctor Owen Monson or the office  This is for beatriceeen [facility] it was a patient back last year  Two thousand twenty two coming up on a year and records need to be sent over, [place] out of state  So we do have a fax number where it needs to be sent and released to or they would not make an appointment because of the prior hematology and oncology information  If you can give us a call back, we'd greatly appreciate it  Four eight, zero, seven, nine, three, three three four nine  Again, [facility] for bryon [facility] A [facility] A [facility] E I and his wife  And we need to get records from you  All other be sent over to the new rheumatologist doctor that is located in [place], [place]  Thank you  Returned call to [place]  Provided direct number to medical records department ([phone]) for patient to request medical records to be sent to new provider  Tenisha carlos appreciative of call